# Patient Record
Sex: FEMALE | Race: OTHER | NOT HISPANIC OR LATINO
[De-identification: names, ages, dates, MRNs, and addresses within clinical notes are randomized per-mention and may not be internally consistent; named-entity substitution may affect disease eponyms.]

---

## 2017-04-12 ENCOUNTER — APPOINTMENT (OUTPATIENT)
Dept: DERMATOLOGY | Facility: CLINIC | Age: 22
End: 2017-04-12

## 2017-04-28 ENCOUNTER — APPOINTMENT (OUTPATIENT)
Dept: DERMATOLOGY | Facility: CLINIC | Age: 22
End: 2017-04-28

## 2017-06-03 ENCOUNTER — INPATIENT (INPATIENT)
Facility: HOSPITAL | Age: 22
LOS: 0 days | Discharge: LEFT BEFORE DISCHARGE | DRG: 641 | End: 2017-06-04
Attending: INTERNAL MEDICINE | Admitting: HOSPITALIST
Payer: COMMERCIAL

## 2017-06-03 VITALS
RESPIRATION RATE: 18 BRPM | HEART RATE: 122 BPM | TEMPERATURE: 105 F | OXYGEN SATURATION: 97 % | DIASTOLIC BLOOD PRESSURE: 68 MMHG | SYSTOLIC BLOOD PRESSURE: 99 MMHG

## 2017-06-03 DIAGNOSIS — A41.9 SEPSIS, UNSPECIFIED ORGANISM: ICD-10-CM

## 2017-06-03 LAB
ALBUMIN SERPL ELPH-MCNC: 4.1 G/DL — SIGNIFICANT CHANGE UP (ref 3.3–5)
ALP SERPL-CCNC: 51 U/L — SIGNIFICANT CHANGE UP (ref 40–120)
ALT FLD-CCNC: 24 U/L RC — SIGNIFICANT CHANGE UP (ref 10–45)
ANION GAP SERPL CALC-SCNC: 15 MMOL/L — SIGNIFICANT CHANGE UP (ref 5–17)
APPEARANCE UR: CLEAR — SIGNIFICANT CHANGE UP
AST SERPL-CCNC: 31 U/L — SIGNIFICANT CHANGE UP (ref 10–40)
BACTERIA # UR AUTO: ABNORMAL /HPF
BASE EXCESS BLDV CALC-SCNC: -0.7 MMOL/L — SIGNIFICANT CHANGE UP (ref -2–2)
BASOPHILS # BLD AUTO: 0 K/UL — SIGNIFICANT CHANGE UP (ref 0–0.2)
BASOPHILS NFR BLD AUTO: 0 % — SIGNIFICANT CHANGE UP (ref 0–2)
BILIRUB SERPL-MCNC: 0.3 MG/DL — SIGNIFICANT CHANGE UP (ref 0.2–1.2)
BILIRUB UR-MCNC: NEGATIVE — SIGNIFICANT CHANGE UP
BUN SERPL-MCNC: 10 MG/DL — SIGNIFICANT CHANGE UP (ref 7–23)
CA-I SERPL-SCNC: 1.27 MMOL/L — SIGNIFICANT CHANGE UP (ref 1.12–1.3)
CALCIUM SERPL-MCNC: 9.5 MG/DL — SIGNIFICANT CHANGE UP (ref 8.4–10.5)
CHLORIDE BLDV-SCNC: 101 MMOL/L — SIGNIFICANT CHANGE UP (ref 96–108)
CHLORIDE SERPL-SCNC: 99 MMOL/L — SIGNIFICANT CHANGE UP (ref 96–108)
CO2 BLDV-SCNC: 25 MMOL/L — SIGNIFICANT CHANGE UP (ref 22–30)
CO2 SERPL-SCNC: 22 MMOL/L — SIGNIFICANT CHANGE UP (ref 22–31)
COLOR SPEC: YELLOW — SIGNIFICANT CHANGE UP
CREAT SERPL-MCNC: 0.74 MG/DL — SIGNIFICANT CHANGE UP (ref 0.5–1.3)
DIFF PNL FLD: ABNORMAL
EOSINOPHIL # BLD AUTO: 0 K/UL — SIGNIFICANT CHANGE UP (ref 0–0.5)
EOSINOPHIL NFR BLD AUTO: 0.2 % — SIGNIFICANT CHANGE UP (ref 0–6)
EPI CELLS # UR: SIGNIFICANT CHANGE UP /HPF
GAS PNL BLDV: 134 MMOL/L — LOW (ref 136–145)
GAS PNL BLDV: SIGNIFICANT CHANGE UP
GLUCOSE BLDV-MCNC: 99 MG/DL — SIGNIFICANT CHANGE UP (ref 70–99)
GLUCOSE SERPL-MCNC: 102 MG/DL — HIGH (ref 70–99)
GLUCOSE UR QL: NEGATIVE — SIGNIFICANT CHANGE UP
HCG UR QL: NEGATIVE — SIGNIFICANT CHANGE UP
HCO3 BLDV-SCNC: 24 MMOL/L — SIGNIFICANT CHANGE UP (ref 21–29)
HCT VFR BLD CALC: 36 % — SIGNIFICANT CHANGE UP (ref 34.5–45)
HCT VFR BLDA CALC: 36 % — LOW (ref 39–50)
HGB BLD CALC-MCNC: 11.8 G/DL — SIGNIFICANT CHANGE UP (ref 11.5–15.5)
HGB BLD-MCNC: 12.2 G/DL — SIGNIFICANT CHANGE UP (ref 11.5–15.5)
KETONES UR-MCNC: ABNORMAL
LACTATE BLDV-MCNC: 1.3 MMOL/L — SIGNIFICANT CHANGE UP (ref 0.7–2)
LEUKOCYTE ESTERASE UR-ACNC: ABNORMAL
LYMPHOCYTES # BLD AUTO: 1 K/UL — SIGNIFICANT CHANGE UP (ref 1–3.3)
LYMPHOCYTES # BLD AUTO: 11.2 % — LOW (ref 13–44)
MCHC RBC-ENTMCNC: 30.1 PG — SIGNIFICANT CHANGE UP (ref 27–34)
MCHC RBC-ENTMCNC: 34 GM/DL — SIGNIFICANT CHANGE UP (ref 32–36)
MCV RBC AUTO: 88.5 FL — SIGNIFICANT CHANGE UP (ref 80–100)
MONOCYTES # BLD AUTO: 0.7 K/UL — SIGNIFICANT CHANGE UP (ref 0–0.9)
MONOCYTES NFR BLD AUTO: 8.1 % — SIGNIFICANT CHANGE UP (ref 2–14)
NEUTROPHILS # BLD AUTO: 7.2 K/UL — SIGNIFICANT CHANGE UP (ref 1.8–7.4)
NEUTROPHILS NFR BLD AUTO: 80.4 % — HIGH (ref 43–77)
NITRITE UR-MCNC: NEGATIVE — SIGNIFICANT CHANGE UP
OTHER CELLS CSF MANUAL: 6 ML/DL — LOW (ref 18–22)
PCO2 BLDV: 43 MMHG — SIGNIFICANT CHANGE UP (ref 35–50)
PH BLDV: 7.37 — SIGNIFICANT CHANGE UP (ref 7.35–7.45)
PH UR: 6 — SIGNIFICANT CHANGE UP (ref 5–8)
PLATELET # BLD AUTO: 128 K/UL — LOW (ref 150–400)
PO2 BLDV: 24 MMHG — LOW (ref 25–45)
POTASSIUM BLDV-SCNC: 3.3 MMOL/L — LOW (ref 3.5–5)
POTASSIUM SERPL-MCNC: 3.4 MMOL/L — LOW (ref 3.5–5.3)
POTASSIUM SERPL-SCNC: 3.4 MMOL/L — LOW (ref 3.5–5.3)
PROT SERPL-MCNC: 7.6 G/DL — SIGNIFICANT CHANGE UP (ref 6–8.3)
PROT UR-MCNC: SIGNIFICANT CHANGE UP
RAPID RVP RESULT: SIGNIFICANT CHANGE UP
RBC # BLD: 4.07 M/UL — SIGNIFICANT CHANGE UP (ref 3.8–5.2)
RBC # FLD: 10.7 % — SIGNIFICANT CHANGE UP (ref 10.3–14.5)
RBC CASTS # UR COMP ASSIST: ABNORMAL /HPF (ref 0–2)
SAO2 % BLDV: 37 % — LOW (ref 67–88)
SODIUM SERPL-SCNC: 136 MMOL/L — SIGNIFICANT CHANGE UP (ref 135–145)
SP GR SPEC: 1.01 — SIGNIFICANT CHANGE UP (ref 1.01–1.02)
UROBILINOGEN FLD QL: NEGATIVE — SIGNIFICANT CHANGE UP
WBC # BLD: 8.9 K/UL — SIGNIFICANT CHANGE UP (ref 3.8–10.5)
WBC # FLD AUTO: 8.9 K/UL — SIGNIFICANT CHANGE UP (ref 3.8–10.5)
WBC UR QL: SIGNIFICANT CHANGE UP /HPF (ref 0–5)

## 2017-06-03 PROCEDURE — 93010 ELECTROCARDIOGRAM REPORT: CPT

## 2017-06-03 PROCEDURE — 99285 EMERGENCY DEPT VISIT HI MDM: CPT | Mod: 25

## 2017-06-03 PROCEDURE — 99223 1ST HOSP IP/OBS HIGH 75: CPT

## 2017-06-03 PROCEDURE — 71020: CPT | Mod: 26

## 2017-06-03 RX ORDER — POTASSIUM CHLORIDE 20 MEQ
40 PACKET (EA) ORAL ONCE
Qty: 0 | Refills: 0 | Status: COMPLETED | OUTPATIENT
Start: 2017-06-03 | End: 2017-06-03

## 2017-06-03 RX ORDER — SODIUM CHLORIDE 9 MG/ML
1000 INJECTION INTRAMUSCULAR; INTRAVENOUS; SUBCUTANEOUS
Qty: 0 | Refills: 0 | Status: DISCONTINUED | OUTPATIENT
Start: 2017-06-03 | End: 2017-06-04

## 2017-06-03 RX ORDER — KETOROLAC TROMETHAMINE 30 MG/ML
15 SYRINGE (ML) INJECTION ONCE
Qty: 0 | Refills: 0 | Status: DISCONTINUED | OUTPATIENT
Start: 2017-06-03 | End: 2017-06-04

## 2017-06-03 RX ORDER — ACETAMINOPHEN 500 MG
1000 TABLET ORAL ONCE
Qty: 0 | Refills: 0 | Status: COMPLETED | OUTPATIENT
Start: 2017-06-03 | End: 2017-06-03

## 2017-06-03 RX ORDER — SODIUM CHLORIDE 9 MG/ML
500 INJECTION INTRAMUSCULAR; INTRAVENOUS; SUBCUTANEOUS
Qty: 0 | Refills: 0 | Status: COMPLETED | OUTPATIENT
Start: 2017-06-03 | End: 2017-06-03

## 2017-06-03 RX ORDER — SODIUM CHLORIDE 9 MG/ML
3 INJECTION INTRAMUSCULAR; INTRAVENOUS; SUBCUTANEOUS ONCE
Qty: 0 | Refills: 0 | Status: COMPLETED | OUTPATIENT
Start: 2017-06-03 | End: 2017-06-03

## 2017-06-03 RX ORDER — FAMOTIDINE 10 MG/ML
20 INJECTION INTRAVENOUS ONCE
Qty: 0 | Refills: 0 | Status: COMPLETED | OUTPATIENT
Start: 2017-06-03 | End: 2017-06-04

## 2017-06-03 RX ORDER — ONDANSETRON 8 MG/1
8 TABLET, FILM COATED ORAL ONCE
Qty: 0 | Refills: 0 | Status: COMPLETED | OUTPATIENT
Start: 2017-06-03 | End: 2017-06-03

## 2017-06-03 RX ADMIN — SODIUM CHLORIDE 100 MILLILITER(S): 9 INJECTION INTRAMUSCULAR; INTRAVENOUS; SUBCUTANEOUS at 21:51

## 2017-06-03 RX ADMIN — ONDANSETRON 8 MILLIGRAM(S): 8 TABLET, FILM COATED ORAL at 21:22

## 2017-06-03 RX ADMIN — Medication 400 MILLIGRAM(S): at 21:22

## 2017-06-03 RX ADMIN — SODIUM CHLORIDE 3 MILLILITER(S): 9 INJECTION INTRAMUSCULAR; INTRAVENOUS; SUBCUTANEOUS at 21:22

## 2017-06-03 RX ADMIN — SODIUM CHLORIDE 2000 MILLILITER(S): 9 INJECTION INTRAMUSCULAR; INTRAVENOUS; SUBCUTANEOUS at 21:22

## 2017-06-03 RX ADMIN — SODIUM CHLORIDE 2000 MILLILITER(S): 9 INJECTION INTRAMUSCULAR; INTRAVENOUS; SUBCUTANEOUS at 22:41

## 2017-06-03 RX ADMIN — SODIUM CHLORIDE 2000 MILLILITER(S): 9 INJECTION INTRAMUSCULAR; INTRAVENOUS; SUBCUTANEOUS at 22:42

## 2017-06-03 RX ADMIN — SODIUM CHLORIDE 2000 MILLILITER(S): 9 INJECTION INTRAMUSCULAR; INTRAVENOUS; SUBCUTANEOUS at 22:43

## 2017-06-03 RX ADMIN — SODIUM CHLORIDE 2000 MILLILITER(S): 9 INJECTION INTRAMUSCULAR; INTRAVENOUS; SUBCUTANEOUS at 21:23

## 2017-06-03 RX ADMIN — Medication 40 MILLIEQUIVALENT(S): at 21:51

## 2017-06-03 NOTE — ED ADULT NURSE NOTE - OBJECTIVE STATEMENT
pt here for fever  her heart rate is also fast  placed on code seosis and followed as per protocol  lungs are clear and she is alert with neuro wdl

## 2017-06-03 NOTE — ED PROVIDER NOTE - ATTENDING CONTRIBUTION TO CARE
------------ATTENDING NOTE------------   22 yo F w/ family c/o fever since this AM, associated diffuse myalgias, runny nose, unproductive cough, nausea, small amts nbnb vomiting, loose nb stools, no recent travel, LMP currently, likely viral but meeting sepsis criteria on arrival, overall clear chest, soft benign abd, awaiting labs/imaging and reassessments -->  - Taran Benson MD   ----------------------------------------------------------------------- ------------ATTENDING NOTE------------   20 yo F w/ family c/o fever since this AM, associated diffuse myalgias, runny nose, unproductive cough, nausea, small amts nbnb vomiting, loose nb stools, no recent travel, LMP currently, likely viral but meeting sepsis criteria on arrival, overall clear chest, soft benign abd, awaiting labs/imaging and reassessments --> improving VS w/ IVF and antipyretics, tolerating small amt PO, continue reassessments -->  - Taran Benson MD   ----------------------------------------------------------------------- ------------ATTENDING NOTE------------   20 yo F w/ family c/o fever since this AM, associated diffuse myalgias, runny nose, unproductive cough, nausea, small amts nbnb vomiting, loose nb stools, no recent travel, LMP currently, likely viral but meeting sepsis criteria on arrival, overall clear chest, soft benign abd, awaiting labs/imaging and reassessments --> improving VS w/ IVF and antipyretics, tolerating small amt PO, continue reassessments --> admit to medicine, pending C diff, no empiric abx, family updated.  - Taran Benson MD   -----------------------------------------------------------------------

## 2017-06-03 NOTE — ED PROVIDER NOTE - CARE PLAN
Principal Discharge DX:	Sepsis  Secondary Diagnosis:	Viral illness Principal Discharge DX:	Sepsis  Goal:	Hypokalemia, initial encounters  Secondary Diagnosis:	Viral illness  Secondary Diagnosis:	Dehydration, moderate

## 2017-06-03 NOTE — ED PROVIDER NOTE - OBJECTIVE STATEMENT
21 year old female otherwise healthy presenting with nausea, vomiting, and diarrhea ("every 15 minutes") since last night, accompanied by fever despite ibuprofen (last dose 1 hour prior to arrival), abdominal cramping, myalgias, and runny nose. Denies new foods, sick contacts, or recent travel. Denies chest pain or shortness of breath. Parents at bedside.

## 2017-06-04 VITALS
SYSTOLIC BLOOD PRESSURE: 97 MMHG | TEMPERATURE: 98 F | RESPIRATION RATE: 19 BRPM | DIASTOLIC BLOOD PRESSURE: 65 MMHG | OXYGEN SATURATION: 100 % | HEART RATE: 77 BPM

## 2017-06-04 DIAGNOSIS — R63.8 OTHER SYMPTOMS AND SIGNS CONCERNING FOOD AND FLUID INTAKE: ICD-10-CM

## 2017-06-04 DIAGNOSIS — A08.4 VIRAL INTESTINAL INFECTION, UNSPECIFIED: ICD-10-CM

## 2017-06-04 DIAGNOSIS — Z29.9 ENCOUNTER FOR PROPHYLACTIC MEASURES, UNSPECIFIED: ICD-10-CM

## 2017-06-04 LAB
ANION GAP SERPL CALC-SCNC: 13 MMOL/L — SIGNIFICANT CHANGE UP (ref 5–17)
BASOPHILS # BLD AUTO: 0 K/UL — SIGNIFICANT CHANGE UP (ref 0–0.2)
BASOPHILS NFR BLD AUTO: 0 % — SIGNIFICANT CHANGE UP (ref 0–2)
BUN SERPL-MCNC: 6 MG/DL — LOW (ref 7–23)
C DIFF GDH STL QL: NEGATIVE — SIGNIFICANT CHANGE UP
C DIFF GDH STL QL: SIGNIFICANT CHANGE UP
CALCIUM SERPL-MCNC: 9 MG/DL — SIGNIFICANT CHANGE UP (ref 8.4–10.5)
CHLORIDE SERPL-SCNC: 109 MMOL/L — HIGH (ref 96–108)
CO2 SERPL-SCNC: 19 MMOL/L — LOW (ref 22–31)
CREAT SERPL-MCNC: 0.59 MG/DL — SIGNIFICANT CHANGE UP (ref 0.5–1.3)
CULTURE RESULTS: SIGNIFICANT CHANGE UP
EOSINOPHIL # BLD AUTO: 0 K/UL — SIGNIFICANT CHANGE UP (ref 0–0.5)
EOSINOPHIL NFR BLD AUTO: 0 % — SIGNIFICANT CHANGE UP (ref 0–6)
GLUCOSE SERPL-MCNC: 81 MG/DL — SIGNIFICANT CHANGE UP (ref 70–99)
HCT VFR BLD CALC: 31.9 % — LOW (ref 34.5–45)
HGB BLD-MCNC: 10.6 G/DL — LOW (ref 11.5–15.5)
LYMPHOCYTES # BLD AUTO: 0.81 K/UL — LOW (ref 1–3.3)
LYMPHOCYTES # BLD AUTO: 18 % — SIGNIFICANT CHANGE UP (ref 13–44)
MCHC RBC-ENTMCNC: 28.6 PG — SIGNIFICANT CHANGE UP (ref 27–34)
MCHC RBC-ENTMCNC: 33.2 GM/DL — SIGNIFICANT CHANGE UP (ref 32–36)
MCV RBC AUTO: 86.2 FL — SIGNIFICANT CHANGE UP (ref 80–100)
MONOCYTES # BLD AUTO: 0.59 K/UL — SIGNIFICANT CHANGE UP (ref 0–0.9)
MONOCYTES NFR BLD AUTO: 13 % — SIGNIFICANT CHANGE UP (ref 2–14)
NEUTROPHILS # BLD AUTO: 3.11 K/UL — SIGNIFICANT CHANGE UP (ref 1.8–7.4)
NEUTROPHILS NFR BLD AUTO: 52 % — SIGNIFICANT CHANGE UP (ref 43–77)
PLATELET # BLD AUTO: SIGNIFICANT CHANGE UP (ref 150–400)
POTASSIUM SERPL-MCNC: 4.6 MMOL/L — SIGNIFICANT CHANGE UP (ref 3.5–5.3)
POTASSIUM SERPL-SCNC: 4.6 MMOL/L — SIGNIFICANT CHANGE UP (ref 3.5–5.3)
RBC # BLD: 3.7 M/UL — LOW (ref 3.8–5.2)
RBC # FLD: 12.9 % — SIGNIFICANT CHANGE UP (ref 10.3–14.5)
SODIUM SERPL-SCNC: 141 MMOL/L — SIGNIFICANT CHANGE UP (ref 135–145)
SPECIMEN SOURCE: SIGNIFICANT CHANGE UP
WBC # BLD: 4.51 K/UL — SIGNIFICANT CHANGE UP (ref 3.8–10.5)
WBC # FLD AUTO: 4.51 K/UL — SIGNIFICANT CHANGE UP (ref 3.8–10.5)

## 2017-06-04 RX ORDER — POTASSIUM CHLORIDE 20 MEQ
20 PACKET (EA) ORAL ONCE
Qty: 0 | Refills: 0 | Status: COMPLETED | OUTPATIENT
Start: 2017-06-04 | End: 2017-06-04

## 2017-06-04 RX ORDER — LOPERAMIDE HCL 2 MG
4 TABLET ORAL ONCE
Qty: 0 | Refills: 0 | Status: COMPLETED | OUTPATIENT
Start: 2017-06-04 | End: 2017-06-04

## 2017-06-04 RX ORDER — ACETAMINOPHEN 500 MG
325 TABLET ORAL EVERY 6 HOURS
Qty: 0 | Refills: 0 | Status: DISCONTINUED | OUTPATIENT
Start: 2017-06-04 | End: 2017-06-04

## 2017-06-04 RX ORDER — ACETAMINOPHEN 500 MG
650 TABLET ORAL EVERY 6 HOURS
Qty: 0 | Refills: 0 | Status: DISCONTINUED | OUTPATIENT
Start: 2017-06-04 | End: 2017-06-04

## 2017-06-04 RX ORDER — SODIUM CHLORIDE 9 MG/ML
1000 INJECTION, SOLUTION INTRAVENOUS
Qty: 0 | Refills: 0 | Status: DISCONTINUED | OUTPATIENT
Start: 2017-06-04 | End: 2017-06-04

## 2017-06-04 RX ORDER — LOPERAMIDE HCL 2 MG
2 TABLET ORAL
Qty: 0 | Refills: 0 | Status: DISCONTINUED | OUTPATIENT
Start: 2017-06-04 | End: 2017-06-04

## 2017-06-04 RX ORDER — ONDANSETRON 8 MG/1
4 TABLET, FILM COATED ORAL EVERY 6 HOURS
Qty: 0 | Refills: 0 | Status: DISCONTINUED | OUTPATIENT
Start: 2017-06-04 | End: 2017-06-04

## 2017-06-04 RX ADMIN — Medication 15 MILLIGRAM(S): at 02:03

## 2017-06-04 RX ADMIN — Medication 2 MILLIGRAM(S): at 02:43

## 2017-06-04 RX ADMIN — SODIUM CHLORIDE 150 MILLILITER(S): 9 INJECTION, SOLUTION INTRAVENOUS at 00:45

## 2017-06-04 RX ADMIN — SODIUM CHLORIDE 150 MILLILITER(S): 9 INJECTION, SOLUTION INTRAVENOUS at 11:05

## 2017-06-04 RX ADMIN — FAMOTIDINE 20 MILLIGRAM(S): 10 INJECTION INTRAVENOUS at 01:58

## 2017-06-04 RX ADMIN — Medication 4 MILLIGRAM(S): at 00:37

## 2017-06-04 RX ADMIN — Medication 20 MILLIEQUIVALENT(S): at 06:49

## 2017-06-04 RX ADMIN — Medication 15 MILLIGRAM(S): at 01:59

## 2017-06-04 NOTE — H&P ADULT. - ASSESSMENT
This is a 21 year old woman with no past medical history presenting with diarrhea, nausea, vomiting, fevers. This is a 21 year old woman with no past medical history presenting with diarrhea, nausea, vomiting, fevers. Suspect viral gastroenteritis, very low suspicion for C. Diff given no recent antibiotics, immunocompetent, despite recent exposure. Will treat supportively with imodium, tylenol, zofran, fluids. Case discussed with Dr. Benson. This is a 21 year old woman with no past medical history presenting with diarrhea, nausea, vomiting, fevers. Suspect viral gastroenteritis, very low suspicion for C. Diff given no recent antibiotics, immunocompetent, despite recent exposure. Will treat supportively with imodium, tylenol, zofran, fluids.  Exam benign, low suspicion for other acute intra-abdominal pathology (torsion vs. appy).  Will consider further imaging should clinical scenario require. Case discussed with Dr. Benson.

## 2017-06-04 NOTE — DISCHARGE NOTE ADULT - PATIENT PORTAL LINK FT
“You can access the [facility] Patient Portal, offered by [facility], by registering with the following website: [email or web address]”

## 2017-06-04 NOTE — H&P ADULT. - HISTORY OF PRESENT ILLNESS
This is a 21 year old woman with no past medical history presenting with diarrhea, nausea, vomiting, fevers. Patient notes that evening prior to admission, she began to have generalized aches and pains, diffuse abdominal pain, subjective fevers (Tmax 105 at home), nausea. She notes that day of admission she woke up with severe watery brown diarrhea, has had 10 episodes throughout the day. She also states that her nausea worsened, had a single episode of NBNB emesis after eating some white rice. She notes that she was having normal BMs prior to day of admission, taking normal POs. She endorses light headedness, denies HA, rhinorrhea (chronic allergic rhinitis), sore throat, cough, dysuria, rash. She notes an aunt with C. Diff, but she has not taken antibiotics in the last year. She notes that her period started the same day of the onset of her symptoms. She notes that her periods come irregularly, from less than a month apart to over two months apart. She notes that typically when they come less that a month apart she has severe cramping and has also had fevers in the past, she initially attributed her symptoms to this. She tried OCPs 1 year ago, but discontinued because of mood symptoms. She is sexually active with a single partner, uses condoms religiously, tested for STDs 2 months ago, negative.     In ED, HR initially 122, improved to 100 with 2L NS (per ED, though only 500cc ordered), BP 99/68, Tmax 105.  Labs notable for WBC 8.9, Hgb 12.2, Plt 128, Na 136, Cr 0.74, lactate 1.3, upreg negative, UA with trace ketones, RVP negative.  CXR clear. This is a 21 year old woman with no past medical history presenting with diarrhea, nausea, vomiting, fevers. Patient notes that evening prior to admission, she began to have generalized aches and pains, diffuse abdominal pain, subjective fevers (Tmax 105 at home), nausea. She notes that day of admission she woke up with severe watery brown diarrhea, has had 10 episodes throughout the day. She also states that her nausea worsened, had a single episode of NBNB emesis after eating some white rice. She notes that she was having normal BMs prior to day of admission, taking normal POs. She endorses light headedness, denies HA, rhinorrhea (chronic allergic rhinitis), sore throat, cough, dysuria, rash. She notes that her grandmother passed away from C. Diff 3/2017, she spent significant amounts of time with her before she passed away.  She notes that she has recurrent UTIs, most recently took amoxicillin 2 months ago for this.  She notes that her period started the same day of the onset of her symptoms. She notes that her periods come irregularly, from less than a month apart to over two months apart. She notes that typically when they come less that a month apart she has severe cramping and has also had fevers in the past, she initially attributed her symptoms to this. She tried OCPs 1 year ago, but discontinued because of mood symptoms. She is sexually active with a single partner, uses condoms religiously, tested for STDs 2 months ago, negative.     In ED, HR initially 122, improved to 100 with 2L NS (per ED, though only 500cc ordered), BP 99/68, Tmax 105.  Labs notable for WBC 8.9, Hgb 12.2, Plt 128, Na 136, Cr 0.74, lactate 1.3, upreg negative, UA with trace ketones, RVP negative.  CXR clear.

## 2017-06-04 NOTE — DISCHARGE NOTE ADULT - CARE PLAN
Principal Discharge DX:	Dehydration, moderate  Goal:	keep hydrated drink  fluid  Instructions for follow-up, activity and diet:	clear diet  Secondary Diagnosis:	Gastroenteritis

## 2017-06-04 NOTE — H&P ADULT. - LAB RESULTS AND INTERPRETATION
Labs personally reviewed. Labs notable for WBC 8.9, Hgb 12.2, Plt 128, Na 136, Cr 0.74, lactate 1.3, upreg negative, UA with trace ketones, RVP negative.

## 2017-06-04 NOTE — H&P ADULT. - PROBLEM SELECTOR PLAN 1
--imodium 4mg now, 2mg PRN for BMs  --tylenol PRN for subjective fevers  --tylenol 325 for mild pain, 650 for moderate, percocet 5/325 for severe  --zofran IV PRN for nausea  --LR @ 150 overnight  --clear liquid diet, advance as tolerated

## 2017-06-09 LAB
CULTURE RESULTS: SIGNIFICANT CHANGE UP
CULTURE RESULTS: SIGNIFICANT CHANGE UP
SPECIMEN SOURCE: SIGNIFICANT CHANGE UP
SPECIMEN SOURCE: SIGNIFICANT CHANGE UP

## 2017-11-25 ENCOUNTER — EMERGENCY (EMERGENCY)
Facility: HOSPITAL | Age: 22
LOS: 1 days | Discharge: HOME | End: 2017-11-25
Attending: EMERGENCY MEDICINE | Admitting: EMERGENCY MEDICINE
Payer: MEDICAID

## 2017-11-25 VITALS
OXYGEN SATURATION: 99 % | HEART RATE: 87 BPM | RESPIRATION RATE: 16 BRPM | SYSTOLIC BLOOD PRESSURE: 106 MMHG | DIASTOLIC BLOOD PRESSURE: 68 MMHG | TEMPERATURE: 98 F

## 2017-11-25 PROCEDURE — 73140 X-RAY EXAM OF FINGER(S): CPT

## 2017-11-25 PROCEDURE — 99283 EMERGENCY DEPT VISIT LOW MDM: CPT

## 2017-11-25 PROCEDURE — 73140 X-RAY EXAM OF FINGER(S): CPT | Mod: 26,RT

## 2017-11-25 PROCEDURE — 99283 EMERGENCY DEPT VISIT LOW MDM: CPT | Mod: 25

## 2017-11-25 RX ORDER — IBUPROFEN 200 MG
600 TABLET ORAL ONCE
Qty: 0 | Refills: 0 | Status: COMPLETED | OUTPATIENT
Start: 2017-11-25 | End: 2017-11-25

## 2017-11-25 RX ADMIN — Medication 600 MILLIGRAM(S): at 15:19

## 2017-11-25 NOTE — ED ADULT NURSE NOTE - OBJECTIVE STATEMENT
22 year old female a/ox3 ambulatory presenting to ed with finger injury after smashing finger in car door 3 days agom has been taking motrin with pain relief  but feels increased pressure to finger.

## 2017-11-25 NOTE — ED PROVIDER NOTE - MEDICAL DECISION MAKING DETAILS
Pt with accidental trauma to right rhumb had jammed in closing car door  no active bleeding Has left subungual hematoma less than 50% of nail surface  will do conservative management xrays neg for fracture -- Leggett

## 2017-11-25 NOTE — ED PROVIDER NOTE - OBJECTIVE STATEMENT
23yo female pt, ambulatory c/o right dominant thumb pain/ swelling s/p injury, jammed by a car door 2days ago. Denies other injuries. Pt stated the swelling went down a little, but wanted to evaluate. Denies sensory changes or weakness to the finger.

## 2017-11-25 NOTE — ED PROVIDER NOTE - ATTENDING CONTRIBUTION TO CARE
I have seen and evaluated this patient with the advance practice clinician.   I agree with the findings  unless other wise stated. I have amended notes where needed.  After my face to face bedside evaluation, I am noting: Pt with accidental trauma to right rhumb had jammed in closing car door  no active bleeding Has left subungual hematoma less than 50% of nail surface  will do conservative management xrays neg for fracture -- Leggett

## 2017-12-15 PROCEDURE — 87040 BLOOD CULTURE FOR BACTERIA: CPT

## 2017-12-15 PROCEDURE — 71046 X-RAY EXAM CHEST 2 VIEWS: CPT

## 2017-12-15 PROCEDURE — 82435 ASSAY OF BLOOD CHLORIDE: CPT

## 2017-12-15 PROCEDURE — 87798 DETECT AGENT NOS DNA AMP: CPT

## 2017-12-15 PROCEDURE — 96374 THER/PROPH/DIAG INJ IV PUSH: CPT

## 2017-12-15 PROCEDURE — 87324 CLOSTRIDIUM AG IA: CPT

## 2017-12-15 PROCEDURE — 99285 EMERGENCY DEPT VISIT HI MDM: CPT | Mod: 25

## 2017-12-15 PROCEDURE — 93005 ELECTROCARDIOGRAM TRACING: CPT

## 2017-12-15 PROCEDURE — 82803 BLOOD GASES ANY COMBINATION: CPT

## 2017-12-15 PROCEDURE — 83690 ASSAY OF LIPASE: CPT

## 2017-12-15 PROCEDURE — 81025 URINE PREGNANCY TEST: CPT

## 2017-12-15 PROCEDURE — 84295 ASSAY OF SERUM SODIUM: CPT

## 2017-12-15 PROCEDURE — G0378: CPT

## 2017-12-15 PROCEDURE — 87633 RESP VIRUS 12-25 TARGETS: CPT

## 2017-12-15 PROCEDURE — 87486 CHLMYD PNEUM DNA AMP PROBE: CPT

## 2017-12-15 PROCEDURE — 87581 M.PNEUMON DNA AMP PROBE: CPT

## 2017-12-15 PROCEDURE — 84132 ASSAY OF SERUM POTASSIUM: CPT

## 2017-12-15 PROCEDURE — 82330 ASSAY OF CALCIUM: CPT

## 2017-12-15 PROCEDURE — 85014 HEMATOCRIT: CPT

## 2017-12-15 PROCEDURE — 83605 ASSAY OF LACTIC ACID: CPT

## 2017-12-15 PROCEDURE — 85027 COMPLETE CBC AUTOMATED: CPT

## 2017-12-15 PROCEDURE — 82947 ASSAY GLUCOSE BLOOD QUANT: CPT

## 2017-12-15 PROCEDURE — 96375 TX/PRO/DX INJ NEW DRUG ADDON: CPT

## 2017-12-15 PROCEDURE — 83735 ASSAY OF MAGNESIUM: CPT

## 2017-12-15 PROCEDURE — 87086 URINE CULTURE/COLONY COUNT: CPT

## 2017-12-15 PROCEDURE — 80048 BASIC METABOLIC PNL TOTAL CA: CPT

## 2017-12-15 PROCEDURE — 80053 COMPREHEN METABOLIC PANEL: CPT

## 2017-12-15 PROCEDURE — 81001 URINALYSIS AUTO W/SCOPE: CPT

## 2017-12-15 PROCEDURE — 84100 ASSAY OF PHOSPHORUS: CPT

## 2018-06-08 ENCOUNTER — EMERGENCY (EMERGENCY)
Facility: HOSPITAL | Age: 23
LOS: 1 days | Discharge: HOME | End: 2018-06-08
Attending: EMERGENCY MEDICINE | Admitting: EMERGENCY MEDICINE
Payer: MEDICAID

## 2018-06-08 VITALS
RESPIRATION RATE: 20 BRPM | DIASTOLIC BLOOD PRESSURE: 64 MMHG | SYSTOLIC BLOOD PRESSURE: 119 MMHG | TEMPERATURE: 98 F | OXYGEN SATURATION: 100 % | HEART RATE: 82 BPM

## 2018-06-08 DIAGNOSIS — Z34.90 ENCOUNTER FOR SUPERVISION OF NORMAL PREGNANCY, UNSPECIFIED, UNSPECIFIED TRIMESTER: ICD-10-CM

## 2018-06-08 LAB
ALBUMIN SERPL ELPH-MCNC: 4.4 G/DL — SIGNIFICANT CHANGE UP (ref 3.3–5)
ALP SERPL-CCNC: 50 U/L — SIGNIFICANT CHANGE UP (ref 40–120)
ALT FLD-CCNC: 11 U/L — SIGNIFICANT CHANGE UP (ref 4–33)
APPEARANCE UR: SIGNIFICANT CHANGE UP
AST SERPL-CCNC: 18 U/L — SIGNIFICANT CHANGE UP (ref 4–32)
BACTERIA # UR AUTO: SIGNIFICANT CHANGE UP
BASOPHILS # BLD AUTO: 0.04 K/UL — SIGNIFICANT CHANGE UP (ref 0–0.2)
BASOPHILS NFR BLD AUTO: 0.6 % — SIGNIFICANT CHANGE UP (ref 0–2)
BILIRUB SERPL-MCNC: 0.2 MG/DL — SIGNIFICANT CHANGE UP (ref 0.2–1.2)
BILIRUB UR-MCNC: NEGATIVE — SIGNIFICANT CHANGE UP
BLD GP AB SCN SERPL QL: NEGATIVE — SIGNIFICANT CHANGE UP
BLOOD UR QL VISUAL: NEGATIVE — SIGNIFICANT CHANGE UP
BUN SERPL-MCNC: 11 MG/DL — SIGNIFICANT CHANGE UP (ref 7–23)
CALCIUM SERPL-MCNC: 9.4 MG/DL — SIGNIFICANT CHANGE UP (ref 8.4–10.5)
CHLORIDE SERPL-SCNC: 101 MMOL/L — SIGNIFICANT CHANGE UP (ref 98–107)
CO2 SERPL-SCNC: 24 MMOL/L — SIGNIFICANT CHANGE UP (ref 22–31)
COLOR SPEC: SIGNIFICANT CHANGE UP
CREAT SERPL-MCNC: 0.52 MG/DL — SIGNIFICANT CHANGE UP (ref 0.5–1.3)
EOSINOPHIL # BLD AUTO: 0.12 K/UL — SIGNIFICANT CHANGE UP (ref 0–0.5)
EOSINOPHIL NFR BLD AUTO: 1.9 % — SIGNIFICANT CHANGE UP (ref 0–6)
GLUCOSE SERPL-MCNC: 91 MG/DL — SIGNIFICANT CHANGE UP (ref 70–99)
GLUCOSE UR-MCNC: NEGATIVE — SIGNIFICANT CHANGE UP
HCG SERPL-ACNC: 549.9 MIU/ML — SIGNIFICANT CHANGE UP
HCT VFR BLD CALC: 32.3 % — LOW (ref 34.5–45)
HGB BLD-MCNC: 11 G/DL — LOW (ref 11.5–15.5)
IMM GRANULOCYTES # BLD AUTO: 0.02 # — SIGNIFICANT CHANGE UP
IMM GRANULOCYTES NFR BLD AUTO: 0.3 % — SIGNIFICANT CHANGE UP (ref 0–1.5)
KETONES UR-MCNC: NEGATIVE — SIGNIFICANT CHANGE UP
LEUKOCYTE ESTERASE UR-ACNC: HIGH
LYMPHOCYTES # BLD AUTO: 2.15 K/UL — SIGNIFICANT CHANGE UP (ref 1–3.3)
LYMPHOCYTES # BLD AUTO: 34.1 % — SIGNIFICANT CHANGE UP (ref 13–44)
MCHC RBC-ENTMCNC: 28.4 PG — SIGNIFICANT CHANGE UP (ref 27–34)
MCHC RBC-ENTMCNC: 34.1 % — SIGNIFICANT CHANGE UP (ref 32–36)
MCV RBC AUTO: 83.2 FL — SIGNIFICANT CHANGE UP (ref 80–100)
MONOCYTES # BLD AUTO: 0.58 K/UL — SIGNIFICANT CHANGE UP (ref 0–0.9)
MONOCYTES NFR BLD AUTO: 9.2 % — SIGNIFICANT CHANGE UP (ref 2–14)
MUCOUS THREADS # UR AUTO: SIGNIFICANT CHANGE UP
NEUTROPHILS # BLD AUTO: 3.39 K/UL — SIGNIFICANT CHANGE UP (ref 1.8–7.4)
NEUTROPHILS NFR BLD AUTO: 53.9 % — SIGNIFICANT CHANGE UP (ref 43–77)
NITRITE UR-MCNC: NEGATIVE — SIGNIFICANT CHANGE UP
NON-SQ EPI CELLS # UR AUTO: <1 — SIGNIFICANT CHANGE UP
NRBC # FLD: 0 — SIGNIFICANT CHANGE UP
PH UR: 6.5 — SIGNIFICANT CHANGE UP (ref 4.6–8)
PLATELET # BLD AUTO: 167 K/UL — SIGNIFICANT CHANGE UP (ref 150–400)
PMV BLD: 11.8 FL — SIGNIFICANT CHANGE UP (ref 7–13)
POTASSIUM SERPL-MCNC: 3.8 MMOL/L — SIGNIFICANT CHANGE UP (ref 3.5–5.3)
POTASSIUM SERPL-SCNC: 3.8 MMOL/L — SIGNIFICANT CHANGE UP (ref 3.5–5.3)
PROT SERPL-MCNC: 7.4 G/DL — SIGNIFICANT CHANGE UP (ref 6–8.3)
PROT UR-MCNC: NEGATIVE MG/DL — SIGNIFICANT CHANGE UP
RBC # BLD: 3.88 M/UL — SIGNIFICANT CHANGE UP (ref 3.8–5.2)
RBC # FLD: 12.2 % — SIGNIFICANT CHANGE UP (ref 10.3–14.5)
RBC CASTS # UR COMP ASSIST: SIGNIFICANT CHANGE UP (ref 0–?)
RH IG SCN BLD-IMP: POSITIVE — SIGNIFICANT CHANGE UP
SODIUM SERPL-SCNC: 136 MMOL/L — SIGNIFICANT CHANGE UP (ref 135–145)
SP GR SPEC: 1.01 — SIGNIFICANT CHANGE UP (ref 1–1.04)
SQUAMOUS # UR AUTO: SIGNIFICANT CHANGE UP
UROBILINOGEN FLD QL: NORMAL MG/DL — SIGNIFICANT CHANGE UP
WBC # BLD: 6.3 K/UL — SIGNIFICANT CHANGE UP (ref 3.8–10.5)
WBC # FLD AUTO: 6.3 K/UL — SIGNIFICANT CHANGE UP (ref 3.8–10.5)
WBC UR QL: SIGNIFICANT CHANGE UP (ref 0–?)

## 2018-06-08 PROCEDURE — 99285 EMERGENCY DEPT VISIT HI MDM: CPT

## 2018-06-08 PROCEDURE — 76830 TRANSVAGINAL US NON-OB: CPT | Mod: 26

## 2018-06-08 RX ORDER — NITROFURANTOIN MACROCRYSTAL 50 MG
100 CAPSULE ORAL ONCE
Qty: 0 | Refills: 0 | Status: DISCONTINUED | OUTPATIENT
Start: 2018-06-08 | End: 2018-06-08

## 2018-06-08 NOTE — ED ADULT TRIAGE NOTE - CHIEF COMPLAINT QUOTE
pt sent by pmd r/o ectopic pt early pregnancy c/o vaginal spotting abdominal cramping evaluated  by pmd yesterday had vaginal sonogram

## 2018-06-08 NOTE — ED PROVIDER NOTE - PROGRESS NOTE DETAILS
LAI JAMES: US Tech called regarding US to r/o ectopic, states there are 4-5 patients ahead, and will tried to expedite. PA JACOB: pt states she was treated for UTI with macrobid x7 days and Amoxicillin x4 days prior to it and symptoms resolved. US: questionable early intrauterine gestation. Discussed with attending, GYN consult. JW Pt received Tx for UTI as out patient.  Gestational sac without confirmed IUP. PT evaluated by OBYGYN placed on the Beta List and will follow up as outpatient or return to the ED for repeat US and HCG.  I reviewed the alarm symptoms of this patient's diagnosis and discussed criteria for their return to the emergency department.  I instructed the patient to return to the emergency department with any alarm symptoms for their specific diagnosis including abdominal pain, vaginal bleeding, nausea, vomiting, any worsening symptoms, and any other concerns.  I instructed this patient to call their primary doctor today, to inform them of their visit to the emergency department, and to obtain a repeat evaluation in the next 24 hours.  This patient understood and agreed with our plan for follow up and felt safe returning home.  At the time of discharge this patient remained in stable condition, in no acute distress, with stable vital signs. JW Pt received Tx for UTI as out patient.  Gestational sac without confirmed IUP. Repeat Exam: Abdomen non-distended without ecchymosis.  Normal bowel sounds.   No tenderness upon percussion in all four quadrants.  No tenderness on palpation in all four quadrants.  No mass, rigidity, guarding, rebound, or organomegaly. PT evaluated by OBYGYN placed on the Beta List and will follow up as outpatient or return to the ED for repeat US and HCG.  I reviewed the alarm symptoms of this patient's diagnosis and discussed criteria for their return to the emergency department.  I instructed the patient to return to the emergency department with any alarm symptoms for their specific diagnosis including abdominal pain, vaginal bleeding, nausea, vomiting, any worsening symptoms, and any other concerns.  I instructed this patient to call their primary doctor today, to inform them of their visit to the emergency department, and to obtain a repeat evaluation in the next 24 hours.  This patient understood and agreed with our plan for follow up and felt safe returning home.  At the time of discharge this patient remained in stable condition, in no acute distress, with stable vital signs.

## 2018-06-08 NOTE — ED PROVIDER NOTE - ATTENDING CONTRIBUTION TO CARE
22F recently found out she was pregnant.  Vag spotting x 3 days, some lower abd pain yesterday went to OB, did US no FHR so concerned for ectopic so sent to ED.  Nontender abd, whitish d/c. No pelvic tenderness.  Check labs, US TV, beta HCG and type.  Low suspicion for ectopic.    VS:  unremarkable    GEN - NAD; well appearing; A+O x3   HEAD - NC/AT     ENT - PEERL, EOMI, mucous membranes  moist , no discharge      NECK: Neck supple, non-tender without lymphadenopathy, no masses, no JVD  PULM - CTA b/l,  symmetric breath sounds  COR -  normal heart sounds    ABD - , ND, NT, soft, no guarding, no rebound, no masses    BACK - no CVA tenderness, nontender spine     EXTREMS - no edema, no deformity, warm and well perfused    SKIN - no rash or bruising      NEUROLOGIC - alert, CN 2-12 intact, sensation nl, motor 5/5 RUE/LUE/RLE/LLE.

## 2018-06-08 NOTE — ED PROVIDER NOTE - MEDICAL DECISION MAKING DETAILS
22 year old female with no PMHx, , LMP 18, sent in by GYN for r/o ectopic pregnancy. Reports episodes of suprapubic tenderness and vaginal spotting.   Plan: CBC, CMP, BHCG, type and screen, transvaginal US 22 year old female with no PMHx, , LMP 18, sent in by GYN for r/o ectopic pregnancy, p/w vaginal spotting x3 days and lower abdominal pain since yesterday.   -well appearing female, , no PMH sent in by GYN to r/o ectopic. Pt had sonogram yesterday with GYN, no fetal HR, advised to go ER but wanted second opinion but unable to get appointment, presents to ER w/ vaginal spotting x3 days, lower abdominal pain since yesterday. Plan: TVUS, Hcg, labs, T&S, coags, UA, Ucx, and reassess.   Plan: CBC, CMP, BHCG, type and screen, transvaginal US

## 2018-06-08 NOTE — CONSULT NOTE ADULT - ASSESSMENT
23 y/o  LMP  with pregnancy of unknown location, possibly early IUP but too soon to tell based on hcg level and sono

## 2018-06-08 NOTE — ED PROVIDER NOTE - OBJECTIVE STATEMENT
22 year old female with no PMHx , LMP 18, sent in by GYN for r/o ectopic pregnancy. Pt reports episodes of suprapubic abd pain x 2 days. States the pain last for a few minutes and then goes away; episodes have occurred 1-2x each day. Also reports vaginal spotting to underwear x 2-3 days. Yesterday, pt presented to GYN yesterday after home urine pregnancy test on  was positive. At OBGYN, heartbeat of fetus was not found. Pt states no blood work was drawn and was told to come to the ED for workup on possibly ectopic pregnancy. Denies any diarrhea, vaginal discharge, hematuria, increased urinary frequency, hematochezia, fever, chills, HA,  dizziness, light-headedness, CP or SOB.

## 2018-06-08 NOTE — ED PROVIDER NOTE - CERVIX
creamy-white vaginal discharge in vaginal vault, unable to visualize cervix secondary to d/c creamy-white vaginal discharge in vaginal vault, unable to visualize cervix secondary to d/c/non tender

## 2018-06-08 NOTE — CONSULT NOTE ADULT - SUBJECTIVE AND OBJECTIVE BOX
21 y/o  LMP 5/5 presenting to ED on her physician's request for r/o ectopic. Pt reports that she was in Dr. Clements's office yesterday for what felt like slight menstrual cramps and some vaginal spotting. Bedside ultrasound was performed and no IUP was seen so pt was advised to go to the ER. Pt called a different doctor for a possible second opinion today and was advised that she cannot be seen in the office for that since it could be emergent and that she should come to the ED. Here she has minimal abd cramping, no vaginal bleeding at this moment but had some spotting earlier. Pt reports some nausea and one episode of vomiting earlier, none now. Denies CP, SOB, fevers, chills  OBHx: N/A  GynHx: Denies  PMH: Denies  SHx: Denies  Meds: None  All: NKDA    VS  T(C): 36.8 (18 @ 15:04)  HR: 82 (18 @ 15:04)  BP: 119/64 (18 @ 15:04)  RR: 20 (18 @ 15:04)  SpO2: 100% (18 @ 15:04)    Physical Exam:  General: NAD  Abdomen: Soft, non-tender, non-distended, no rebound, no guarding               11.0   6.30  )-----------( 167      (  @ 15:55 )             32.3      @ 15:55    136  |  101  |  11  ----------------------------<  91  3.8   |  24  |  0.52    Ca    9.4       @ 15:55    TPro  7.4  /  Alb  4.4  /  TBili  0.2  /  DBili  x   /  AST  18  /  ALT  11  /  AlkPhos  50   @ 15:55    HCG Quantitative, Serum (18 @ 15:55)    HCG Quantitative, Serum: 549.9    < from: US Transvaginal (18 @ 18:04) >  FINDINGS:    Uterus: 7.2 x 4.2 x 5.8 cm. Within normal limits.    Endometrium: 23 mm. Questionable 3 mm gestational sac Right ovary: 3.6 x   1.0 x 2.1 cm. Within normal limits.    Left ovary: 5.2 x 2.4 x 2.7 cm. 2.3 x 1.3 x 1.7 cm corpus luteal cyst.    Fluid: None.    IMPRESSION:    Questionable early intrauterine gestation. Recommend correlation with   serial quantitative beta-hCG levels and follow-up sonography.    < end of copied text >

## 2018-06-08 NOTE — ED ADULT NURSE NOTE - OBJECTIVE STATEMENT
Patient presented to ED with c/o lower abdominal crmping since last night with vaginal spotting.  Patient saw OB and was instructed to go to ED for r/o ectopic d/t no visual of embryo.  Blood work drawn and sent to lab.  ER physician evaluated patient, plan is for sono.  Family present t the bedside, will continue to monitor patient closely.  Maribeth CHAMORRO

## 2018-06-08 NOTE — CONSULT NOTE ADULT - PROBLEM SELECTOR RECOMMENDATION 9
- No acute gyn intervention  - f/u with Dr. Clements on Monday for repeat bHCG    d/w Dr. Starr Miles, PGY-2  [phone]

## 2018-06-10 LAB
BACTERIA UR CULT: SIGNIFICANT CHANGE UP
SPECIMEN SOURCE: SIGNIFICANT CHANGE UP

## 2018-06-12 ENCOUNTER — EMERGENCY (EMERGENCY)
Facility: HOSPITAL | Age: 23
LOS: 1 days | Discharge: HOME | End: 2018-06-12
Attending: EMERGENCY MEDICINE | Admitting: EMERGENCY MEDICINE
Payer: MEDICAID

## 2018-06-12 VITALS
OXYGEN SATURATION: 100 % | SYSTOLIC BLOOD PRESSURE: 107 MMHG | HEART RATE: 77 BPM | DIASTOLIC BLOOD PRESSURE: 66 MMHG | RESPIRATION RATE: 16 BRPM | TEMPERATURE: 99 F

## 2018-06-12 LAB
APPEARANCE UR: SIGNIFICANT CHANGE UP
BILIRUB UR-MCNC: NEGATIVE — SIGNIFICANT CHANGE UP
BLOOD UR QL VISUAL: HIGH
COLOR SPEC: YELLOW — SIGNIFICANT CHANGE UP
GLUCOSE UR-MCNC: NEGATIVE — SIGNIFICANT CHANGE UP
HCG SERPL-ACNC: 3406 MIU/ML — SIGNIFICANT CHANGE UP
KETONES UR-MCNC: NEGATIVE — SIGNIFICANT CHANGE UP
LEUKOCYTE ESTERASE UR-ACNC: HIGH
MUCOUS THREADS # UR AUTO: SIGNIFICANT CHANGE UP
NITRITE UR-MCNC: NEGATIVE — SIGNIFICANT CHANGE UP
PH UR: 7 — SIGNIFICANT CHANGE UP (ref 4.6–8)
PROT UR-MCNC: 30 MG/DL — HIGH
RBC CASTS # UR COMP ASSIST: SIGNIFICANT CHANGE UP (ref 0–?)
SP GR SPEC: 1.02 — SIGNIFICANT CHANGE UP (ref 1–1.04)
SQUAMOUS # UR AUTO: SIGNIFICANT CHANGE UP
UROBILINOGEN FLD QL: NORMAL MG/DL — SIGNIFICANT CHANGE UP
WBC UR QL: SIGNIFICANT CHANGE UP (ref 0–?)

## 2018-06-12 PROCEDURE — 99283 EMERGENCY DEPT VISIT LOW MDM: CPT

## 2018-06-12 NOTE — ED PROVIDER NOTE - PROGRESS NOTE DETAILS
spoke to obgyn resident, updated on updated hcg. she recommends TVUS to be performed this week. pt made appointment with her current obgyn for TVUS this week. discussed return precautions. stable for d/c. - Hemant Guidry MD

## 2018-06-12 NOTE — ED PROVIDER NOTE - OBJECTIVE STATEMENT
23 y/o F a0 w/ no significant PMHx presents to ED for repeat BHCG. Was seen in ER  w/ possible early IU and told to return to ED or OB for repeat BHCG. Pt's OB is Dr. Clements however pt states she will be seeing a new OB, Dr. Aranda. Admits gradual onset of abdominal pain over the last few days. Denies vaginal bleeding, vaginal discharge, urinary sx, fever, chills, and other complaints. LMP . Non-smoker.

## 2018-06-12 NOTE — ED PROVIDER NOTE - MEDICAL DECISION MAKING DETAILS
21 y/o F  seen x4days ago to eval for ectopic, returns for HCG check, pt denies vaginal bleeding and pelvic pain. Plan - to obtain beta HCG and discuss w/ OB/GYN.

## 2018-06-13 LAB — SPECIMEN SOURCE: SIGNIFICANT CHANGE UP

## 2018-06-14 LAB — BACTERIA UR CULT: SIGNIFICANT CHANGE UP

## 2018-07-10 ENCOUNTER — EMERGENCY (EMERGENCY)
Facility: HOSPITAL | Age: 23
LOS: 1 days | Discharge: LEFT BEFORE DISCHARGE | End: 2018-07-10
Admitting: EMERGENCY MEDICINE

## 2018-07-10 VITALS
TEMPERATURE: 98 F | OXYGEN SATURATION: 99 % | HEART RATE: 79 BPM | RESPIRATION RATE: 18 BRPM | DIASTOLIC BLOOD PRESSURE: 52 MMHG | SYSTOLIC BLOOD PRESSURE: 107 MMHG

## 2018-07-10 NOTE — ED ADULT TRIAGE NOTE - CHIEF COMPLAINT QUOTE
Patient is 9 weeks pregnant and has c/o abdominal pain, frequent urination, and vaginal bleeding that started  this morning.

## 2018-07-30 ENCOUNTER — LABORATORY RESULT (OUTPATIENT)
Age: 23
End: 2018-07-30

## 2018-07-31 ENCOUNTER — APPOINTMENT (OUTPATIENT)
Dept: ANTEPARTUM | Facility: CLINIC | Age: 23
End: 2018-07-31
Payer: MEDICAID

## 2018-07-31 ENCOUNTER — ASOB RESULT (OUTPATIENT)
Age: 23
End: 2018-07-31

## 2018-07-31 PROCEDURE — 76801 OB US < 14 WKS SINGLE FETUS: CPT

## 2018-07-31 PROCEDURE — 36416 COLLJ CAPILLARY BLOOD SPEC: CPT

## 2018-07-31 PROCEDURE — 76813 OB US NUCHAL MEAS 1 GEST: CPT

## 2018-08-07 ENCOUNTER — EMERGENCY (EMERGENCY)
Facility: HOSPITAL | Age: 23
LOS: 1 days | Discharge: HOME | End: 2018-08-07
Admitting: EMERGENCY MEDICINE
Payer: MEDICAID

## 2018-08-07 VITALS
DIASTOLIC BLOOD PRESSURE: 59 MMHG | RESPIRATION RATE: 14 BRPM | OXYGEN SATURATION: 100 % | HEART RATE: 76 BPM | SYSTOLIC BLOOD PRESSURE: 96 MMHG | TEMPERATURE: 98 F

## 2018-08-07 PROCEDURE — 99284 EMERGENCY DEPT VISIT MOD MDM: CPT

## 2018-08-07 NOTE — ED ADULT TRIAGE NOTE - CHIEF COMPLAINT QUOTE
"I'm pregnant and I'm having really intense cramping." Pt is  approx. 13 weeks pregnant c/o abdominal cramping x 1 week, worsening tonight, rated 9/10, intermittent with lightheadedness and dizziness. Patient called her OBGYN and was instructed to seek ED eval. Denies nausea, vomiting, diarrhea, dysuria, no PMH.

## 2018-08-08 VITALS
SYSTOLIC BLOOD PRESSURE: 100 MMHG | HEART RATE: 75 BPM | OXYGEN SATURATION: 100 % | DIASTOLIC BLOOD PRESSURE: 61 MMHG | RESPIRATION RATE: 16 BRPM

## 2018-08-08 LAB
ALBUMIN SERPL ELPH-MCNC: 4.1 G/DL — SIGNIFICANT CHANGE UP (ref 3.3–5)
ALP SERPL-CCNC: 37 U/L — LOW (ref 40–120)
ALT FLD-CCNC: 10 U/L — SIGNIFICANT CHANGE UP (ref 4–33)
APPEARANCE UR: SIGNIFICANT CHANGE UP
AST SERPL-CCNC: 14 U/L — SIGNIFICANT CHANGE UP (ref 4–32)
BACTERIA # UR AUTO: SIGNIFICANT CHANGE UP
BASOPHILS # BLD AUTO: 0.03 K/UL — SIGNIFICANT CHANGE UP (ref 0–0.2)
BASOPHILS NFR BLD AUTO: 0.4 % — SIGNIFICANT CHANGE UP (ref 0–2)
BILIRUB SERPL-MCNC: < 0.2 MG/DL — LOW (ref 0.2–1.2)
BILIRUB UR-MCNC: NEGATIVE — SIGNIFICANT CHANGE UP
BLOOD UR QL VISUAL: NEGATIVE — SIGNIFICANT CHANGE UP
BUN SERPL-MCNC: 8 MG/DL — SIGNIFICANT CHANGE UP (ref 7–23)
CALCIUM SERPL-MCNC: 9.5 MG/DL — SIGNIFICANT CHANGE UP (ref 8.4–10.5)
CHLORIDE SERPL-SCNC: 100 MMOL/L — SIGNIFICANT CHANGE UP (ref 98–107)
CO2 SERPL-SCNC: 21 MMOL/L — LOW (ref 22–31)
COLOR SPEC: YELLOW — SIGNIFICANT CHANGE UP
CREAT SERPL-MCNC: 0.45 MG/DL — LOW (ref 0.5–1.3)
EOSINOPHIL # BLD AUTO: 0.14 K/UL — SIGNIFICANT CHANGE UP (ref 0–0.5)
EOSINOPHIL NFR BLD AUTO: 2 % — SIGNIFICANT CHANGE UP (ref 0–6)
GLUCOSE SERPL-MCNC: 92 MG/DL — SIGNIFICANT CHANGE UP (ref 70–99)
GLUCOSE UR-MCNC: NEGATIVE — SIGNIFICANT CHANGE UP
HCG SERPL-ACNC: SIGNIFICANT CHANGE UP MIU/ML
HCT VFR BLD CALC: 26.8 % — LOW (ref 34.5–45)
HGB BLD-MCNC: 9 G/DL — LOW (ref 11.5–15.5)
IMM GRANULOCYTES # BLD AUTO: 0.02 # — SIGNIFICANT CHANGE UP
IMM GRANULOCYTES NFR BLD AUTO: 0.3 % — SIGNIFICANT CHANGE UP (ref 0–1.5)
KETONES UR-MCNC: NEGATIVE — SIGNIFICANT CHANGE UP
LEUKOCYTE ESTERASE UR-ACNC: HIGH
LYMPHOCYTES # BLD AUTO: 2 K/UL — SIGNIFICANT CHANGE UP (ref 1–3.3)
LYMPHOCYTES # BLD AUTO: 28.9 % — SIGNIFICANT CHANGE UP (ref 13–44)
MCHC RBC-ENTMCNC: 28.9 PG — SIGNIFICANT CHANGE UP (ref 27–34)
MCHC RBC-ENTMCNC: 33.6 % — SIGNIFICANT CHANGE UP (ref 32–36)
MCV RBC AUTO: 86.2 FL — SIGNIFICANT CHANGE UP (ref 80–100)
MONOCYTES # BLD AUTO: 0.53 K/UL — SIGNIFICANT CHANGE UP (ref 0–0.9)
MONOCYTES NFR BLD AUTO: 7.7 % — SIGNIFICANT CHANGE UP (ref 2–14)
MUCOUS THREADS # UR AUTO: SIGNIFICANT CHANGE UP
NEUTROPHILS # BLD AUTO: 4.2 K/UL — SIGNIFICANT CHANGE UP (ref 1.8–7.4)
NEUTROPHILS NFR BLD AUTO: 60.7 % — SIGNIFICANT CHANGE UP (ref 43–77)
NITRITE UR-MCNC: NEGATIVE — SIGNIFICANT CHANGE UP
NON-SQ EPI CELLS # UR AUTO: 1 — SIGNIFICANT CHANGE UP
NRBC # FLD: 0 — SIGNIFICANT CHANGE UP
PH UR: 7 — SIGNIFICANT CHANGE UP (ref 4.6–8)
PLATELET # BLD AUTO: 154 K/UL — SIGNIFICANT CHANGE UP (ref 150–400)
PMV BLD: 11.4 FL — SIGNIFICANT CHANGE UP (ref 7–13)
POTASSIUM SERPL-MCNC: 3.5 MMOL/L — SIGNIFICANT CHANGE UP (ref 3.5–5.3)
POTASSIUM SERPL-SCNC: 3.5 MMOL/L — SIGNIFICANT CHANGE UP (ref 3.5–5.3)
PROT SERPL-MCNC: 6.6 G/DL — SIGNIFICANT CHANGE UP (ref 6–8.3)
PROT UR-MCNC: 20 MG/DL — SIGNIFICANT CHANGE UP
RBC # BLD: 3.11 M/UL — LOW (ref 3.8–5.2)
RBC # FLD: 12.5 % — SIGNIFICANT CHANGE UP (ref 10.3–14.5)
RBC CASTS # UR COMP ASSIST: SIGNIFICANT CHANGE UP (ref 0–?)
SODIUM SERPL-SCNC: 134 MMOL/L — LOW (ref 135–145)
SP GR SPEC: 1.02 — SIGNIFICANT CHANGE UP (ref 1–1.04)
SQUAMOUS # UR AUTO: SIGNIFICANT CHANGE UP
UROBILINOGEN FLD QL: NORMAL MG/DL — SIGNIFICANT CHANGE UP
WBC # BLD: 6.92 K/UL — SIGNIFICANT CHANGE UP (ref 3.8–10.5)
WBC # FLD AUTO: 6.92 K/UL — SIGNIFICANT CHANGE UP (ref 3.8–10.5)
WBC CLUMPS #/AREA URNS HPF: PRESENT — HIGH (ref 0–?)
WBC UR QL: HIGH (ref 0–?)
YEAST BUDDING # UR COMP ASSIST: SIGNIFICANT CHANGE UP

## 2018-08-08 PROCEDURE — 76830 TRANSVAGINAL US NON-OB: CPT | Mod: 26

## 2018-08-08 RX ORDER — CEPHALEXIN 500 MG
1 CAPSULE ORAL
Qty: 14 | Refills: 0 | OUTPATIENT
Start: 2018-08-08 | End: 2018-08-14

## 2018-08-08 RX ORDER — SODIUM CHLORIDE 9 MG/ML
1000 INJECTION INTRAMUSCULAR; INTRAVENOUS; SUBCUTANEOUS ONCE
Qty: 0 | Refills: 0 | Status: COMPLETED | OUTPATIENT
Start: 2018-08-08 | End: 2018-08-08

## 2018-08-08 RX ADMIN — SODIUM CHLORIDE 2000 MILLILITER(S): 9 INJECTION INTRAMUSCULAR; INTRAVENOUS; SUBCUTANEOUS at 00:43

## 2018-08-08 RX ADMIN — SODIUM CHLORIDE 1000 MILLILITER(S): 9 INJECTION INTRAMUSCULAR; INTRAVENOUS; SUBCUTANEOUS at 01:30

## 2018-08-08 NOTE — ED ADULT NURSE NOTE - OBJECTIVE STATEMENT
Pt rcvd to INT9 c/o mid-umbilical area abdominal pain "menstrual-like cramping" during pregnancy, pt is 13wks pregnant , LMP 5/5.  Pt states has OB follow up and has had confirmed IUP via sono.  Denies dysuria, hematuria, diarrhea, fevers/chills/weakness, no nausea/vomiting during 2nd trimester.  States had UTI in May but completed treatment w/o residual symptoms. No hx stomach surgeries or significant medical history.  Pt aaox4, ambulatory, well appearing. States abdominal cramping is improved at present from initial presentation to ED. Refusing pain rx at present. Pt VS as noted, BP soft - PA Vargas made aware. IV placed to R AC #20g, labs drawn/sent as ordered, urine specimens collected/sent as ordered. IVF NS Bolus infusing.  Pt awaiting TVUS.  Pt & spouse updated to plan of care, Will CTM.

## 2018-08-08 NOTE — ED PROVIDER NOTE - OBJECTIVE STATEMENT
23 y/o female  13 weeks pregannt (LMP 18) presents to ED c/o abdominal pain x 1 week. Pt. states over past week ahs been experiencing worsening lower abdominal pain which she describes as cramping in nature and became worse today - c/o 9/10 lower abdominal pain with some slight nausea as well. Denies fever chills vaginal bleeding or dc, weakness dizziness urinary symptoms cp sob.

## 2018-08-08 NOTE — ED ADULT NURSE REASSESSMENT NOTE - NS ED NURSE REASSESS COMMENT FT1
Pt reassessed, states no abd pain, appearing well, states feels better.  Vitals as noted, improved.  Discussed results so far, awaiting US TV result.  Will monitor.

## 2018-08-09 LAB — SPECIMEN SOURCE: SIGNIFICANT CHANGE UP

## 2018-08-10 LAB
-  AMPICILLIN: SIGNIFICANT CHANGE UP
-  CIPROFLOXACIN: SIGNIFICANT CHANGE UP
-  NITROFURANTOIN: SIGNIFICANT CHANGE UP
-  TETRACYCLINE: SIGNIFICANT CHANGE UP
-  VANCOMYCIN: SIGNIFICANT CHANGE UP
BACTERIA UR CULT: SIGNIFICANT CHANGE UP
METHOD TYPE: SIGNIFICANT CHANGE UP
ORGANISM # SPEC MICROSCOPIC CNT: SIGNIFICANT CHANGE UP
ORGANISM # SPEC MICROSCOPIC CNT: SIGNIFICANT CHANGE UP

## 2018-09-25 ENCOUNTER — ASOB RESULT (OUTPATIENT)
Age: 23
End: 2018-09-25

## 2018-09-25 ENCOUNTER — APPOINTMENT (OUTPATIENT)
Dept: ANTEPARTUM | Facility: CLINIC | Age: 23
End: 2018-09-25
Payer: COMMERCIAL

## 2018-09-25 PROCEDURE — 76805 OB US >/= 14 WKS SNGL FETUS: CPT

## 2018-09-25 PROCEDURE — 76817 TRANSVAGINAL US OBSTETRIC: CPT

## 2018-10-03 ENCOUNTER — APPOINTMENT (OUTPATIENT)
Dept: ANTEPARTUM | Facility: CLINIC | Age: 23
End: 2018-10-03
Payer: COMMERCIAL

## 2018-10-03 ENCOUNTER — ASOB RESULT (OUTPATIENT)
Age: 23
End: 2018-10-03

## 2018-10-03 PROCEDURE — 76816 OB US FOLLOW-UP PER FETUS: CPT

## 2018-10-24 NOTE — DISCHARGE NOTE ADULT - ABILITY TO HEAR (WITH HEARING AID OR HEARING APPLIANCE IF NORMALLY USED):
Adequate: hears normal conversation without difficulty Consent: Written consent was obtained and risks were reviewed including but not limited to scarring, infection, bleeding, scabbing, incomplete removal, nerve damage and allergy to anesthesia.

## 2018-11-12 ENCOUNTER — OUTPATIENT (OUTPATIENT)
Dept: OUTPATIENT SERVICES | Facility: HOSPITAL | Age: 23
LOS: 1 days | End: 2018-11-12

## 2018-11-12 VITALS — HEIGHT: 63 IN | WEIGHT: 141.1 LBS

## 2018-11-12 DIAGNOSIS — Z3A.00 WEEKS OF GESTATION OF PREGNANCY NOT SPECIFIED: ICD-10-CM

## 2018-11-12 DIAGNOSIS — O26.899 OTHER SPECIFIED PREGNANCY RELATED CONDITIONS, UNSPECIFIED TRIMESTER: ICD-10-CM

## 2018-11-12 LAB
BASOPHILS # BLD AUTO: 0.03 K/UL — SIGNIFICANT CHANGE UP (ref 0–0.2)
BASOPHILS NFR BLD AUTO: 0.4 % — SIGNIFICANT CHANGE UP (ref 0–2)
BLD GP AB SCN SERPL QL: NEGATIVE — SIGNIFICANT CHANGE UP
EOSINOPHIL # BLD AUTO: 0.04 K/UL — SIGNIFICANT CHANGE UP (ref 0–0.5)
EOSINOPHIL NFR BLD AUTO: 0.6 % — SIGNIFICANT CHANGE UP (ref 0–6)
FIBRINOGEN PPP-MCNC: 682 MG/DL — HIGH (ref 350–510)
HCT VFR BLD CALC: 29.1 % — LOW (ref 34.5–45)
HGB BLD-MCNC: 9.6 G/DL — LOW (ref 11.5–15.5)
IMM GRANULOCYTES # BLD AUTO: 0.05 # — SIGNIFICANT CHANGE UP
IMM GRANULOCYTES NFR BLD AUTO: 0.7 % — SIGNIFICANT CHANGE UP (ref 0–1.5)
LYMPHOCYTES # BLD AUTO: 1.11 K/UL — SIGNIFICANT CHANGE UP (ref 1–3.3)
LYMPHOCYTES # BLD AUTO: 15.5 % — SIGNIFICANT CHANGE UP (ref 13–44)
MCHC RBC-ENTMCNC: 29.4 PG — SIGNIFICANT CHANGE UP (ref 27–34)
MCHC RBC-ENTMCNC: 33 % — SIGNIFICANT CHANGE UP (ref 32–36)
MCV RBC AUTO: 89 FL — SIGNIFICANT CHANGE UP (ref 80–100)
MONOCYTES # BLD AUTO: 0.5 K/UL — SIGNIFICANT CHANGE UP (ref 0–0.9)
MONOCYTES NFR BLD AUTO: 7 % — SIGNIFICANT CHANGE UP (ref 2–14)
NEUTROPHILS # BLD AUTO: 5.41 K/UL — SIGNIFICANT CHANGE UP (ref 1.8–7.4)
NEUTROPHILS NFR BLD AUTO: 75.8 % — SIGNIFICANT CHANGE UP (ref 43–77)
NRBC # FLD: 0 — SIGNIFICANT CHANGE UP
PLATELET # BLD AUTO: 163 K/UL — SIGNIFICANT CHANGE UP (ref 150–400)
PMV BLD: 11.6 FL — SIGNIFICANT CHANGE UP (ref 7–13)
RBC # BLD: 3.27 M/UL — LOW (ref 3.8–5.2)
RBC # FLD: 12.1 % — SIGNIFICANT CHANGE UP (ref 10.3–14.5)
RH IG SCN BLD-IMP: POSITIVE — SIGNIFICANT CHANGE UP
WBC # BLD: 7.14 K/UL — SIGNIFICANT CHANGE UP (ref 3.8–10.5)
WBC # FLD AUTO: 7.14 K/UL — SIGNIFICANT CHANGE UP (ref 3.8–10.5)

## 2018-11-12 RX ORDER — SODIUM CHLORIDE 9 MG/ML
1000 INJECTION, SOLUTION INTRAVENOUS ONCE
Qty: 0 | Refills: 0 | Status: DISCONTINUED | OUTPATIENT
Start: 2018-11-12 | End: 2018-11-13

## 2018-11-12 RX ORDER — SODIUM CHLORIDE 9 MG/ML
1000 INJECTION, SOLUTION INTRAVENOUS
Qty: 0 | Refills: 0 | Status: DISCONTINUED | OUTPATIENT
Start: 2018-11-12 | End: 2018-11-13

## 2018-11-12 RX ORDER — OXYTOCIN 10 UNIT/ML
333.33 VIAL (ML) INJECTION
Qty: 20 | Refills: 0 | Status: DISCONTINUED | OUTPATIENT
Start: 2018-11-12 | End: 2018-11-13

## 2018-11-12 RX ORDER — SODIUM CHLORIDE 9 MG/ML
3 INJECTION INTRAMUSCULAR; INTRAVENOUS; SUBCUTANEOUS ONCE
Qty: 0 | Refills: 0 | Status: DISCONTINUED | OUTPATIENT
Start: 2018-11-12 | End: 2018-11-13

## 2018-11-13 ENCOUNTER — TRANSCRIPTION ENCOUNTER (OUTPATIENT)
Age: 23
End: 2018-11-13

## 2018-11-13 DIAGNOSIS — O36.8390 MATERNAL CARE FOR ABNORMALITIES OF THE FETAL HEART RATE OR RHYTHM, UNSPECIFIED TRIMESTER, NOT APPLICABLE OR UNSPECIFIED: ICD-10-CM

## 2018-11-13 LAB — T PALLIDUM AB TITR SER: NEGATIVE — SIGNIFICANT CHANGE UP

## 2018-11-13 NOTE — DISCHARGE NOTE ANTEPARTUM - CARE PLAN
Principal Discharge DX:	Fall, initial encounter  Goal:	Pregnancy wellness  Assessment and plan of treatment:	Return if contractions LOF VB, signs of labor

## 2018-11-13 NOTE — DISCHARGE NOTE ANTEPARTUM - PATIENT PORTAL LINK FT
You can access the [facility] Patient Portal, offered by [facility], by registering with the following website: [email or web address]

## 2018-12-31 NOTE — ED PROVIDER NOTE - RESPIRATORY [-], MLM
Wound Infection   AMBULATORY CARE:   A wound infection  occurs when bacteria enters a break in the skin  The infection may involve just the skin, or affect deeper tissues or organs close to the wound  Signs and symptoms of a wound infection:  Your symptoms may start a few days after you get the wound, or may not occur for a month or two after the wound happens:  · Fever    · Warm, red, painful, or swollen skin near the wound    · Blood or pus coming from the wound     · A foul odor coming from the wound  Seek care immediately if:   · You feel short of breath  · Your heart is beating faster than usual      · You feel confused  · Blood soaks through your bandages  · Your wound comes apart or feels like it is ripping  · You have severe pain  · You see red streaks coming from the infected area  Contact your healthcare provider if:   · You have a fever or chills  · You have more pain, redness, or swelling near your wound  · Your symptoms do not improve  · The skin around your wound feels numb  · You have questions or concerns about your condition or care  Treatment for a wound infection  will depend on how severe the wound is, its location, and whether other areas are affected  It may also depend on your health and the length of time you have had the wound  Ask your healthcare provider about these and other treatments you may need:  · Medicine  will be given to treat the infection and decrease pain and swelling  · Wound care  may be done to clean your wound and help it heal  A wound vacuum may also be placed over your wound to help it heal      · Hyperbaric oxygen therapy  (HBO) may be used to get more oxygen to your tissues to help them heal  The pressurized oxygen is given as you sit in a pressure chamber  · Surgery  may be needed to clean the wound or remove infected or dead tissue  Surgery may also be needed to remove a foreign object    Care for your wound as directed: Keep your wound clean and dry  You may need to cover your wound when you bathe so it does not get wet  Clean your wound as directed with soap and water or wound   Put on new, clean bandages as directed  Change your bandages when they get wet or dirty  Help your wound heal:   · Eat a variety of healthy foods  Examples include fruits, vegetables, whole-grain breads, low-fat dairy products, beans, lean meats, and fish  Healthy foods may help you heal faster  You may also need to take vitamins and minerals  Ask if you need to be on a special diet  · Manage other health conditions  Follow your healthcare provider's directions to manage health conditions that can cause slow wound healing  Examples include high blood pressure and diabetes  · Do not smoke  Nicotine and other chemicals in cigarettes and cigars can cause slow wound healing  Ask your healthcare provider for information if you currently smoke and need help to quit  E-cigarettes or smokeless tobacco still contain nicotine  Talk to your healthcare provider before you use these products  Follow up with your healthcare provider in 1 to 2 days:  Write down your questions so you remember to ask them during your visits  © 2017 [facility] [facility] Information is for End User's use only and may not be sold, redistributed or otherwise used for commercial purposes  All illustrations and images included in CareNotes® are the copyrighted property of A D A M [employer]  or Rolando Joseph  The above information is an  only  It is not intended as medical advice for individual conditions or treatments  Talk to your doctor, nurse or pharmacist before following any medical regimen to see if it is safe and effective for you  Animal Bite   AMBULATORY CARE:   Animal bite  injuries range from shallow cuts to deep, life-threatening wounds  Animal bites occur more often on the hands, arms, legs, and face   Bites from dogs and cats are the most common injuries  Common symptoms include the following:   · Cut or punctured skin     · Skin torn from your body    · Swollen or bruised skin, even if the skin is not broken  Seek care immediately if:   · You have a fever  · Your wound is red, swollen, and draining pus  · You see red streaks on the skin around the wound  · You can no longer move the bitten area  · Your heartbeat and breathing are much faster than usual     · You feel dizzy and confused  Contact your healthcare provider if:   · Your pain does not get better, even after you take pain medicine  · You have nightmares or flashbacks about the animal bite  · You have questions or concerns about your condition or care  Treatment for an animal bite  may include any of the following:  · Irrigation and debridement  may be needed to clean out your wound  Dead, damaged, or infected tissue may be cut away to help your wound heal     · Medicines:      ¨ Antibiotics  prevent or treat a bacterial infection  ¨ Prescription pain medicine  may be given  Ask how to take this medicine safely  ¨ A tetanus vaccine  may be needed to prevent tetanus  Tetanus is a life-threatening bacterial infection that affects the nerves and muscles  The bacteria can be spread through animal bites  ¨ A rabies vaccine  may be needed to prevent rabies  Rabies is a life-threatening viral infection  The virus can be spread through animal bites  · Stitches  may be needed if your wound is large and not infected  · Surgery  may be needed to repair deep injuries or severe wounds  Manage your symptoms:   · Apply antibiotic ointment as directed  This helps prevent infection in minor skin wounds  Antibiotic ointment is available without a prescription  · Keep the wound clean and covered  Wash the wound every day with soap and water or germ-killing cleanser  Ask what kinds of bandages to use  · Apply ice to your wound    Ice helps prevent tissue damage and decreases swelling and pain  Use an ice pack, or put crushed ice in a plastic bag  Cover it with a towel  Apply ice on your wound for 15 to 20 minutes every hour or as directed  · Elevate your wound  Raise your wound above the level of your heart as often as you can  This will help decrease swelling and pain  Prop your wound on pillows or blankets to keep it elevated comfortably  Prevent another animal bite:   · Learn to recognize the signs of a scared pet  Avoid quick, sudden movements  · Do not step between animals that are fighting  · Do not leave a pet alone with a young child  · Do not disturb an animal while it eats, sleeps, or cares for its young  · Do not approach an animal you do not know, especially one that is tied up or caged  · Stay away from animals that seem sick or act strangely  · Do not feed or capture wild animals  Follow up with your healthcare provider as directed:  Write down your questions so you remember to ask them during your visits  © 2017 [address] [facility] Information is for End User's use only and may not be sold, redistributed or otherwise used for commercial purposes  All illustrations and images included in CareNotes® are the copyrighted property of A D A M , Inc  or Rolando Joseph  The above information is an  only  It is not intended as medical advice for individual conditions or treatments  Talk to your doctor, nurse or pharmacist before following any medical regimen to see if it is safe and effective for you  no shortness of breath

## 2019-05-29 NOTE — ED PROVIDER NOTE - NSCAREINITIATED _GEN_ER
Pt called states her insurance will not cover brand name lovenox but will cover generic enoxaparin for over $200 copay out of pocket.  Was told per insurance they will only cover a 30 or 90 day rx.  [facility] pharmacist is recommending getting a 1 month rx for lovenox 2 per day 60 syringes.    S/w Dr. Vaughn who states pt does not need 60 syringes and is willing to give a 2 week supply or 30 syringes of enoxaparin.  30 syringes ordered and escribed to [facility].    Neeta GIPSON RN  EP Triage     Deborah Mullins(NP)

## 2019-10-25 ENCOUNTER — APPOINTMENT (OUTPATIENT)
Dept: ANTEPARTUM | Facility: CLINIC | Age: 24
End: 2019-10-25
Payer: MEDICAID

## 2019-10-25 PROCEDURE — 76813 OB US NUCHAL MEAS 1 GEST: CPT

## 2019-10-25 PROCEDURE — 76816 OB US FOLLOW-UP PER FETUS: CPT

## 2019-11-19 ENCOUNTER — APPOINTMENT (OUTPATIENT)
Dept: OBGYN | Facility: CLINIC | Age: 24
End: 2019-11-19
Payer: MEDICAID

## 2019-11-19 PROCEDURE — 0502F SUBSEQUENT PRENATAL CARE: CPT

## 2019-12-17 ENCOUNTER — APPOINTMENT (OUTPATIENT)
Dept: ANTEPARTUM | Facility: CLINIC | Age: 24
End: 2019-12-17
Payer: MEDICAID

## 2019-12-17 PROCEDURE — 76817 TRANSVAGINAL US OBSTETRIC: CPT

## 2019-12-17 PROCEDURE — 76811 OB US DETAILED SNGL FETUS: CPT

## 2020-01-13 ENCOUNTER — APPOINTMENT (OUTPATIENT)
Dept: OBGYN | Facility: CLINIC | Age: 25
End: 2020-01-13

## 2020-02-17 ENCOUNTER — APPOINTMENT (OUTPATIENT)
Dept: OBGYN | Facility: CLINIC | Age: 25
End: 2020-02-17

## 2020-02-18 ENCOUNTER — OUTPATIENT (OUTPATIENT)
Dept: OUTPATIENT SERVICES | Facility: HOSPITAL | Age: 25
LOS: 1 days | Discharge: HOME | End: 2020-02-18
Payer: MEDICAID

## 2020-02-18 VITALS — HEART RATE: 83 BPM | SYSTOLIC BLOOD PRESSURE: 93 MMHG | DIASTOLIC BLOOD PRESSURE: 52 MMHG

## 2020-02-18 VITALS
SYSTOLIC BLOOD PRESSURE: 105 MMHG | RESPIRATION RATE: 14 BRPM | HEART RATE: 85 BPM | DIASTOLIC BLOOD PRESSURE: 54 MMHG | TEMPERATURE: 98 F

## 2020-02-18 DIAGNOSIS — O26.899 OTHER SPECIFIED PREGNANCY RELATED CONDITIONS, UNSPECIFIED TRIMESTER: ICD-10-CM

## 2020-02-18 DIAGNOSIS — Z98.891 HISTORY OF UTERINE SCAR FROM PREVIOUS SURGERY: Chronic | ICD-10-CM

## 2020-02-18 DIAGNOSIS — Z3A.00 WEEKS OF GESTATION OF PREGNANCY NOT SPECIFIED: ICD-10-CM

## 2020-02-18 PROCEDURE — 59025 FETAL NON-STRESS TEST: CPT | Mod: 26

## 2020-02-18 PROCEDURE — 76816 OB US FOLLOW-UP PER FETUS: CPT | Mod: 26

## 2020-02-18 PROCEDURE — 99212 OFFICE O/P EST SF 10 MIN: CPT | Mod: 25

## 2020-02-18 NOTE — OB PROVIDER TRIAGE NOTE - NSOBPROVIDERNOTE_OBGYN_ALL_OB_FT
25 y/o pt 29.1 weeks  presents to triage with c/o "baby moving non stop". pt expresses concern that since baby is moving too much, it might affect her previous incision. pt denies bleeding, LOF, and contractions. pt denies n/v/d, fever or chills. pt endorses +fetal movement.   Ap uncomplicated thus far    NKDA  PMH: denies  PSH: denies  OB:  primary  failure to progress 2019 FT   GYN: denies  Social hx: denies  Medications: PNV    General: A&O x3  Lungs: anterior and posterior lungs clear upon auscultation bilaterally  Cardiac: R/R/R  Abdomen: soft, non tender    NST in progress 25 y/o pt 29.1 weeks  presents to triage with c/o "baby moving non stop". pt expresses concern that since baby is moving too much, it might affect her previous incision. pt denies bleeding, LOF, and contractions. pt denies n/v/d, fever or chills. pt endorses +fetal movement.   Ap uncomplicated thus far    NKDA  PMH: denies  PSH: denies  OB:  primary  failure to progress 2019 FT   GYN: denies  Social hx: denies  Medications: PNV    General: A&O x3  Lungs: anterior and posterior lungs clear upon auscultation bilaterally  Cardiac: R/R/R  Abdomen: soft, non tender    NST reactive with moderate variability, cat 1  toco no contractions noted    TAS @1630: BPP 8/8, VIKAS 13.95cm, posterior placenta, vertex presentation      maternal and fetal status reassuring   d/w with Dr Wharton  pt cleared for discharge  pt to follow up with scheduled appointment  fetal kick counts reviewed  labor precautions reviewed

## 2020-02-18 NOTE — OB PROVIDER TRIAGE NOTE - NSHPPHYSICALEXAM_GEN_ALL_CORE
Vital Signs Last 24 Hrs  T(C): 36.7 (18 Feb 2020 14:30), Max: 36.7 (18 Feb 2020 14:30)  T(F): 98.1 (18 Feb 2020 14:30), Max: 98.1 (18 Feb 2020 14:30)  HR: 83 (18 Feb 2020 15:25) (83 - 85)  BP: 93/52 (18 Feb 2020 15:25) (93/52 - 105/54)  BP(mean): --  RR: 14 (18 Feb 2020 14:30) (14 - 14)  SpO2: --    General: A&O x3  Lungs: anterior and posterior lungs clear upon auscultation bilaterally  Cardiac: R/R/R  Abdomen: soft, non tender    NST in progress Vital Signs Last 24 Hrs  T(C): 36.7 (18 Feb 2020 14:30), Max: 36.7 (18 Feb 2020 14:30)  T(F): 98.1 (18 Feb 2020 14:30), Max: 98.1 (18 Feb 2020 14:30)  HR: 83 (18 Feb 2020 15:25) (83 - 85)  BP: 93/52 (18 Feb 2020 15:25) (93/52 - 105/54)  BP(mean): --  RR: 14 (18 Feb 2020 14:30) (14 - 14)  SpO2: --    General: A&O x3  Lungs: anterior and posterior lungs clear upon auscultation bilaterally  Cardiac: R/R/R  Abdomen: soft, non tender    NST reactive with moderate variability, cat 1  toco no contractions noted      TAS @1630: BPP 8/8, VIKAS 13.95cm, posterior placenta, vertex presentation

## 2020-02-18 NOTE — OB PROVIDER TRIAGE NOTE - HISTORY OF PRESENT ILLNESS
25 y/o pt 29.1 weeks  presents to triage with c/o "baby moving non stop". pt expresses concern that since baby is moving too much, it might affect her previous incision. pt denies bleeding, LOF, and contractions. pt denies n/v/d, fever or chills. pt endorses +fetal movement.   Ap uncomplicated thus far    NKDA  PMH: denies  PSH: denies  OB:  primary  failure to progress 2019 FT   GYN: denies  Social hx: denies  Medications: PNV

## 2020-02-21 ENCOUNTER — APPOINTMENT (OUTPATIENT)
Dept: OBGYN | Facility: CLINIC | Age: 25
End: 2020-02-21

## 2020-03-06 ENCOUNTER — APPOINTMENT (OUTPATIENT)
Dept: OBGYN | Facility: CLINIC | Age: 25
End: 2020-03-06

## 2020-03-23 ENCOUNTER — APPOINTMENT (OUTPATIENT)
Dept: OBGYN | Facility: CLINIC | Age: 25
End: 2020-03-23

## 2020-03-30 ENCOUNTER — APPOINTMENT (OUTPATIENT)
Dept: OBGYN | Facility: CLINIC | Age: 25
End: 2020-03-30

## 2020-04-07 ENCOUNTER — APPOINTMENT (OUTPATIENT)
Dept: OBGYN | Facility: CLINIC | Age: 25
End: 2020-04-07

## 2020-04-13 ENCOUNTER — APPOINTMENT (OUTPATIENT)
Dept: ANTEPARTUM | Facility: CLINIC | Age: 25
End: 2020-04-13
Payer: SELF-PAY

## 2020-04-13 PROCEDURE — 76819 FETAL BIOPHYS PROFIL W/O NST: CPT

## 2020-04-13 PROCEDURE — 76816 OB US FOLLOW-UP PER FETUS: CPT

## 2020-04-14 ENCOUNTER — TRANSCRIPTION ENCOUNTER (OUTPATIENT)
Age: 25
End: 2020-04-14

## 2020-04-15 ENCOUNTER — TRANSCRIPTION ENCOUNTER (OUTPATIENT)
Age: 25
End: 2020-04-15

## 2020-04-15 ENCOUNTER — INPATIENT (INPATIENT)
Facility: HOSPITAL | Age: 25
LOS: 2 days | Discharge: HOME | End: 2020-04-18
Attending: OBSTETRICS & GYNECOLOGY | Admitting: OBSTETRICS & GYNECOLOGY

## 2020-04-15 VITALS — TEMPERATURE: 97 F

## 2020-04-15 DIAGNOSIS — O26.899 OTHER SPECIFIED PREGNANCY RELATED CONDITIONS, UNSPECIFIED TRIMESTER: ICD-10-CM

## 2020-04-15 DIAGNOSIS — Z98.891 HISTORY OF UTERINE SCAR FROM PREVIOUS SURGERY: Chronic | ICD-10-CM

## 2020-04-15 DIAGNOSIS — Z3A.00 WEEKS OF GESTATION OF PREGNANCY NOT SPECIFIED: ICD-10-CM

## 2020-04-15 LAB
BASOPHILS # BLD AUTO: 0.04 K/UL — SIGNIFICANT CHANGE UP (ref 0–0.2)
BASOPHILS NFR BLD AUTO: 0.5 % — SIGNIFICANT CHANGE UP (ref 0–2)
BLD GP AB SCN SERPL QL: NEGATIVE — SIGNIFICANT CHANGE UP
EOSINOPHIL # BLD AUTO: 0.01 K/UL — SIGNIFICANT CHANGE UP (ref 0–0.5)
EOSINOPHIL NFR BLD AUTO: 0.1 % — SIGNIFICANT CHANGE UP (ref 0–6)
HCT VFR BLD CALC: 30.7 % — LOW (ref 34.5–45)
HGB BLD-MCNC: 10 G/DL — LOW (ref 11.5–15.5)
IMM GRANULOCYTES NFR BLD AUTO: 0.9 % — SIGNIFICANT CHANGE UP (ref 0–1.5)
LYMPHOCYTES # BLD AUTO: 1.13 K/UL — SIGNIFICANT CHANGE UP (ref 1–3.3)
LYMPHOCYTES # BLD AUTO: 14 % — SIGNIFICANT CHANGE UP (ref 13–44)
MCHC RBC-ENTMCNC: 28.2 PG — SIGNIFICANT CHANGE UP (ref 27–34)
MCHC RBC-ENTMCNC: 32.6 % — SIGNIFICANT CHANGE UP (ref 32–36)
MCV RBC AUTO: 86.5 FL — SIGNIFICANT CHANGE UP (ref 80–100)
MONOCYTES # BLD AUTO: 0.8 K/UL — SIGNIFICANT CHANGE UP (ref 0–0.9)
MONOCYTES NFR BLD AUTO: 9.9 % — SIGNIFICANT CHANGE UP (ref 2–14)
NEUTROPHILS # BLD AUTO: 6.04 K/UL — SIGNIFICANT CHANGE UP (ref 1.8–7.4)
NEUTROPHILS NFR BLD AUTO: 74.6 % — SIGNIFICANT CHANGE UP (ref 43–77)
NRBC # FLD: 0 K/UL — SIGNIFICANT CHANGE UP (ref 0–0)
PLATELET # BLD AUTO: 159 K/UL — SIGNIFICANT CHANGE UP (ref 150–400)
PMV BLD: 11.7 FL — SIGNIFICANT CHANGE UP (ref 7–13)
RBC # BLD: 3.55 M/UL — LOW (ref 3.8–5.2)
RBC # FLD: 12.3 % — SIGNIFICANT CHANGE UP (ref 10.3–14.5)
RH IG SCN BLD-IMP: POSITIVE — SIGNIFICANT CHANGE UP
RH IG SCN BLD-IMP: POSITIVE — SIGNIFICANT CHANGE UP
SARS-COV-2 RNA SPEC QL NAA+PROBE: SIGNIFICANT CHANGE UP
WBC # BLD: 8.09 K/UL — SIGNIFICANT CHANGE UP (ref 3.8–10.5)
WBC # FLD AUTO: 8.09 K/UL — SIGNIFICANT CHANGE UP (ref 3.8–10.5)

## 2020-04-15 RX ORDER — SODIUM CHLORIDE 9 MG/ML
1000 INJECTION, SOLUTION INTRAVENOUS
Refills: 0 | Status: DISCONTINUED | OUTPATIENT
Start: 2020-04-15 | End: 2020-04-16

## 2020-04-15 RX ORDER — SODIUM CHLORIDE 9 MG/ML
1000 INJECTION, SOLUTION INTRAVENOUS
Refills: 0 | Status: DISCONTINUED | OUTPATIENT
Start: 2020-04-15 | End: 2020-04-15

## 2020-04-15 RX ORDER — OXYCODONE HYDROCHLORIDE 5 MG/1
5 TABLET ORAL
Refills: 0 | Status: COMPLETED | OUTPATIENT
Start: 2020-04-15 | End: 2020-04-22

## 2020-04-15 RX ORDER — OXYTOCIN 10 UNIT/ML
333.33 VIAL (ML) INJECTION
Qty: 20 | Refills: 0 | Status: DISCONTINUED | OUTPATIENT
Start: 2020-04-15 | End: 2020-04-16

## 2020-04-15 RX ORDER — MAGNESIUM HYDROXIDE 400 MG/1
30 TABLET, CHEWABLE ORAL
Refills: 0 | Status: DISCONTINUED | OUTPATIENT
Start: 2020-04-15 | End: 2020-04-18

## 2020-04-15 RX ORDER — CITRIC ACID/SODIUM CITRATE 300-500 MG
30 SOLUTION, ORAL ORAL ONCE
Refills: 0 | Status: COMPLETED | OUTPATIENT
Start: 2020-04-15 | End: 2020-04-15

## 2020-04-15 RX ORDER — SIMETHICONE 80 MG/1
80 TABLET, CHEWABLE ORAL EVERY 4 HOURS
Refills: 0 | Status: DISCONTINUED | OUTPATIENT
Start: 2020-04-15 | End: 2020-04-18

## 2020-04-15 RX ORDER — NALOXONE HYDROCHLORIDE 4 MG/.1ML
0.1 SPRAY NASAL
Refills: 0 | Status: DISCONTINUED | OUTPATIENT
Start: 2020-04-15 | End: 2020-04-15

## 2020-04-15 RX ORDER — KETOROLAC TROMETHAMINE 30 MG/ML
30 SYRINGE (ML) INJECTION EVERY 6 HOURS
Refills: 0 | Status: DISCONTINUED | OUTPATIENT
Start: 2020-04-15 | End: 2020-04-16

## 2020-04-15 RX ORDER — SODIUM CHLORIDE 9 MG/ML
1000 INJECTION, SOLUTION INTRAVENOUS ONCE
Refills: 0 | Status: COMPLETED | OUTPATIENT
Start: 2020-04-15 | End: 2020-04-15

## 2020-04-15 RX ORDER — OXYCODONE HYDROCHLORIDE 5 MG/1
5 TABLET ORAL
Refills: 0 | Status: DISCONTINUED | OUTPATIENT
Start: 2020-04-15 | End: 2020-04-15

## 2020-04-15 RX ORDER — GLYCERIN ADULT
1 SUPPOSITORY, RECTAL RECTAL AT BEDTIME
Refills: 0 | Status: DISCONTINUED | OUTPATIENT
Start: 2020-04-15 | End: 2020-04-18

## 2020-04-15 RX ORDER — IBUPROFEN 200 MG
600 TABLET ORAL EVERY 6 HOURS
Refills: 0 | Status: COMPLETED | OUTPATIENT
Start: 2020-04-15 | End: 2021-03-14

## 2020-04-15 RX ORDER — OXYTOCIN 10 UNIT/ML
333.33 VIAL (ML) INJECTION
Qty: 20 | Refills: 0 | Status: DISCONTINUED | OUTPATIENT
Start: 2020-04-15 | End: 2020-04-15

## 2020-04-15 RX ORDER — MORPHINE SULFATE 50 MG/1
0.1 CAPSULE, EXTENDED RELEASE ORAL ONCE
Refills: 0 | Status: DISCONTINUED | OUTPATIENT
Start: 2020-04-15 | End: 2020-04-15

## 2020-04-15 RX ORDER — OXYCODONE HYDROCHLORIDE 5 MG/1
10 TABLET ORAL
Refills: 0 | Status: DISCONTINUED | OUTPATIENT
Start: 2020-04-15 | End: 2020-04-15

## 2020-04-15 RX ORDER — HYDROMORPHONE HYDROCHLORIDE 2 MG/ML
1 INJECTION INTRAMUSCULAR; INTRAVENOUS; SUBCUTANEOUS
Refills: 0 | Status: DISCONTINUED | OUTPATIENT
Start: 2020-04-15 | End: 2020-04-15

## 2020-04-15 RX ORDER — LANOLIN
1 OINTMENT (GRAM) TOPICAL EVERY 6 HOURS
Refills: 0 | Status: DISCONTINUED | OUTPATIENT
Start: 2020-04-15 | End: 2020-04-18

## 2020-04-15 RX ORDER — TETANUS TOXOID, REDUCED DIPHTHERIA TOXOID AND ACELLULAR PERTUSSIS VACCINE, ADSORBED 5; 2.5; 8; 8; 2.5 [IU]/.5ML; [IU]/.5ML; UG/.5ML; UG/.5ML; UG/.5ML
0.5 SUSPENSION INTRAMUSCULAR ONCE
Refills: 0 | Status: DISCONTINUED | OUTPATIENT
Start: 2020-04-15 | End: 2020-04-18

## 2020-04-15 RX ORDER — DIPHENHYDRAMINE HCL 50 MG
25 CAPSULE ORAL EVERY 6 HOURS
Refills: 0 | Status: DISCONTINUED | OUTPATIENT
Start: 2020-04-15 | End: 2020-04-18

## 2020-04-15 RX ORDER — OXYCODONE HYDROCHLORIDE 5 MG/1
5 TABLET ORAL ONCE
Refills: 0 | Status: DISCONTINUED | OUTPATIENT
Start: 2020-04-15 | End: 2020-04-18

## 2020-04-15 RX ORDER — FAMOTIDINE 10 MG/ML
20 INJECTION INTRAVENOUS ONCE
Refills: 0 | Status: COMPLETED | OUTPATIENT
Start: 2020-04-15 | End: 2020-04-15

## 2020-04-15 RX ORDER — ONDANSETRON 8 MG/1
4 TABLET, FILM COATED ORAL EVERY 6 HOURS
Refills: 0 | Status: DISCONTINUED | OUTPATIENT
Start: 2020-04-15 | End: 2020-04-15

## 2020-04-15 RX ORDER — HEPARIN SODIUM 5000 [USP'U]/ML
5000 INJECTION INTRAVENOUS; SUBCUTANEOUS EVERY 12 HOURS
Refills: 0 | Status: DISCONTINUED | OUTPATIENT
Start: 2020-04-15 | End: 2020-04-18

## 2020-04-15 RX ORDER — ACETAMINOPHEN 500 MG
975 TABLET ORAL
Refills: 0 | Status: DISCONTINUED | OUTPATIENT
Start: 2020-04-15 | End: 2020-04-18

## 2020-04-15 RX ORDER — METOCLOPRAMIDE HCL 10 MG
10 TABLET ORAL ONCE
Refills: 0 | Status: COMPLETED | OUTPATIENT
Start: 2020-04-15 | End: 2020-04-15

## 2020-04-15 RX ADMIN — Medication 30 MILLILITER(S): at 18:47

## 2020-04-15 RX ADMIN — Medication 10 MILLIGRAM(S): at 18:47

## 2020-04-15 RX ADMIN — SODIUM CHLORIDE 2000 MILLILITER(S): 9 INJECTION, SOLUTION INTRAVENOUS at 18:49

## 2020-04-15 RX ADMIN — FAMOTIDINE 20 MILLIGRAM(S): 10 INJECTION INTRAVENOUS at 18:46

## 2020-04-15 NOTE — OB NEONATOLOGY/PEDIATRICIAN DELIVERY SUMMARY - NSPEDSNEONOTESA_OBGYN_ALL_OB_FT
Baby is a 37.2 week GA male born to a 23 y/o  mother via repeat CS after going into labor. Maternal history uncomplicated. Pregnancy uncomplicated. Maternal blood O+. Prenatal labs neg/neg/NR/I. GBS unknown. ROM at time of delivery (<18hrs) with clear fluid. Baby born vigorous and crying spontaneously. DCC x 30 seconds. Warmed, dried, stimulated, suctioned. Apgars 9/9. Mom defers hep B, unsure about circumcision, plans to initiate breastfeeding. Highest maternal temp 36.6 C, EOS 0.06.    Baby stable for transfer to  nursery. Parents updated.

## 2020-04-15 NOTE — DISCHARGE NOTE OB - MEDICATION SUMMARY - MEDICATIONS TO TAKE
I will START or STAY ON the medications listed below when I get home from the hospital:    acetaminophen 325 mg oral tablet  -- 3 tab(s) by mouth   -- Indication: For Pain, as needed    ibuprofen 600 mg oral tablet  -- 1 tab(s) by mouth every 6 hours  -- Indication: For Pain, as needed    PNV Prenatal oral tablet  -- 1 tab(s) by mouth once a day  -- Indication: For supplement

## 2020-04-15 NOTE — DISCHARGE NOTE OB - PATIENT PORTAL LINK FT
You can access the FollowMyHealth Patient Portal offered by [facility] by registering at the following website: [email or web address]. By joining [employer]’s FollowMyHealth portal, you will also be able to view your health information using other applications (apps) compatible with our system.

## 2020-04-15 NOTE — OB PROVIDER TRIAGE NOTE - NSOBPROVIDERNOTE_OBGYN_ALL_OB_FT
25 y/o   @ 38.2 wks gestation presents with c/o painful uterine  contractions every 2-3 minutes since 1700 denies any LOF or VB reports +FM denies any n/v/d denies any fever or chills ap care uncomplicated thus far denies any recent sick exposure   sched rpt  2020      abdomen: soft, nt on palp  SVE: /-3  cat 1 FHT  toco: every 2-3 minutes  T(C): --  HR: 106 (04-15-20 @ 18:08) (106 - 106)  BP: 117/62 (04-15-20 @ 18:08) (117/62 - 117/62)  RR: --  SpO2: --  cat 1 FHT  toco: every 2 minutes   d/w dr armendariz  admit to l&d  rpt  in labor @ 38.2 wks gestation   see admission orders    NKA  med hx: denies  surg hx:   2019 primary  arrest of dilatation 7#11  gyn hx: denies  ob hx:   2019 primary  arrest of dilatation 7#11  meds: PNV      ht: 5'3  wt:168    last meal: 1200

## 2020-04-15 NOTE — DISCHARGE NOTE OB - CARE PLAN
Principal Discharge DX:	Previous  section  Goal:	return to normal health  Assessment and plan of treatment:	nothing in the vagina x 6 weeks  no heavy lifting x 6 weeks

## 2020-04-15 NOTE — OB PROVIDER DELIVERY SUMMARY - NSPROVIDERDELIVERYNOTE_OBGYN_ALL_OB_FT
Gravid uterus with grossly normal bilateral fallopian tubes and ovaries. Adhesions noted in subcutaneous tissue and fascia. Upon entering peritoneum no adhesions noted. Liveborn male fetus with apgars 9/9. Fibrillar placed over hysterotomy, which was close in two layers of Caprosyn.  EBL: 800cc   IVF: 2300cc  UOP: 300cc Gravid uterus with grossly normal bilateral fallopian tubes and ovaries. Adhesions noted in subcutaneous tissue and fascia. Upon entering peritoneum no adhesions noted. Liveborn male fetus with apgars 9/9. Fibrillar placed over hysterotomy, which was close in two layers of Caprosyn.  EBL: 800cc   IVF: 2300cc  UOP: 300cc      Attending note  Agree with above  rLTCS in labor   Please see dictation for details     Marina Rosales

## 2020-04-15 NOTE — OB PROVIDER H&P - ASSESSMENT
23 y/o   @ 38.2 wks gestation presents with c/o painful uterine  contractions every 2-3 minutes since 1700 denies any LOF or VB reports +FM denies any n/v/d denies any fever or chills ap care uncomplicated thus far denies any recent sick exposure   sched rpt  2020      abdomen: soft, nt on palp  SVE: /-3  cat 1 FHT  toco: every 2-3 minutes  T(C): --  HR: 106 (04-15-20 @ 18:08) (106 - 106)  BP: 117/62 (04-15-20 @ 18:08) (117/62 - 117/62)  RR: --  SpO2: --  cat 1 FHT  toco: every 2 minutes   d/w dr armendariz  admit to l&d  rpt  in labor @ 38.2 wks gestation   see admission orders    NKA  med hx: denies  surg hx:   2019 primary  arrest of dilatation 7#11  gyn hx: denies  ob hx:   2019 primary  arrest of dilatation 7#11  meds: PNV      ht: 5'3  wt:168    last meal: 1200

## 2020-04-15 NOTE — DISCHARGE NOTE OB - HOSPITAL COURSE
She presented in labor and previous c/section.   She had uncomplicated repeat low transverse c/section

## 2020-04-15 NOTE — DISCHARGE NOTE OB - CARE PROVIDER_API CALL
Santos Sullivan)  MaternalFetal Medicine; Obstetrics and Gynecology  [address] [place] [address]  Phone: [phone]  Fax: [phone]  Follow Up Time:

## 2020-04-15 NOTE — OB RN DELIVERY SUMMARY - NS_SEPSISRSKCALC_OBGYN_ALL_OB_FT
EOS calculated successfully. EOS Risk Factor: 0.5/1000 live births ([facility] national incidence); GA=38w2d; Temp=97.9; ROM=0.05; GBS='Unknown'; Antibiotics='No antibiotics or any antibiotics < 2 hrs prior to birth'

## 2020-04-16 LAB
BASOPHILS # BLD AUTO: 0.03 K/UL — SIGNIFICANT CHANGE UP (ref 0–0.2)
BASOPHILS NFR BLD AUTO: 0.2 % — SIGNIFICANT CHANGE UP (ref 0–2)
EOSINOPHIL # BLD AUTO: 0.01 K/UL — SIGNIFICANT CHANGE UP (ref 0–0.5)
EOSINOPHIL NFR BLD AUTO: 0.1 % — SIGNIFICANT CHANGE UP (ref 0–6)
HCT VFR BLD CALC: 28.8 % — LOW (ref 34.5–45)
HGB BLD-MCNC: 9.5 G/DL — LOW (ref 11.5–15.5)
IMM GRANULOCYTES NFR BLD AUTO: 0.6 % — SIGNIFICANT CHANGE UP (ref 0–1.5)
LYMPHOCYTES # BLD AUTO: 1.21 K/UL — SIGNIFICANT CHANGE UP (ref 1–3.3)
LYMPHOCYTES # BLD AUTO: 9.6 % — LOW (ref 13–44)
MCHC RBC-ENTMCNC: 28.6 PG — SIGNIFICANT CHANGE UP (ref 27–34)
MCHC RBC-ENTMCNC: 33 % — SIGNIFICANT CHANGE UP (ref 32–36)
MCV RBC AUTO: 86.7 FL — SIGNIFICANT CHANGE UP (ref 80–100)
MONOCYTES # BLD AUTO: 1.06 K/UL — HIGH (ref 0–0.9)
MONOCYTES NFR BLD AUTO: 8.4 % — SIGNIFICANT CHANGE UP (ref 2–14)
NEUTROPHILS # BLD AUTO: 10.18 K/UL — HIGH (ref 1.8–7.4)
NEUTROPHILS NFR BLD AUTO: 81.1 % — HIGH (ref 43–77)
NRBC # FLD: 0.02 K/UL — SIGNIFICANT CHANGE UP (ref 0–0)
PLATELET # BLD AUTO: 152 K/UL — SIGNIFICANT CHANGE UP (ref 150–400)
PMV BLD: 12.2 FL — SIGNIFICANT CHANGE UP (ref 7–13)
RBC # BLD: 3.32 M/UL — LOW (ref 3.8–5.2)
RBC # FLD: 12.6 % — SIGNIFICANT CHANGE UP (ref 10.3–14.5)
T PALLIDUM AB TITR SER: NEGATIVE — SIGNIFICANT CHANGE UP
WBC # BLD: 12.57 K/UL — HIGH (ref 3.8–10.5)
WBC # FLD AUTO: 12.57 K/UL — HIGH (ref 3.8–10.5)

## 2020-04-16 RX ORDER — FERROUS SULFATE 325(65) MG
325 TABLET ORAL DAILY
Refills: 0 | Status: DISCONTINUED | OUTPATIENT
Start: 2020-04-16 | End: 2020-04-18

## 2020-04-16 RX ORDER — SODIUM CHLORIDE 9 MG/ML
500 INJECTION, SOLUTION INTRAVENOUS ONCE
Refills: 0 | Status: COMPLETED | OUTPATIENT
Start: 2020-04-16 | End: 2020-04-16

## 2020-04-16 RX ORDER — SENNA PLUS 8.6 MG/1
1 TABLET ORAL
Refills: 0 | Status: DISCONTINUED | OUTPATIENT
Start: 2020-04-16 | End: 2020-04-18

## 2020-04-16 RX ADMIN — Medication 30 MILLIGRAM(S): at 09:28

## 2020-04-16 RX ADMIN — Medication 30 MILLIGRAM(S): at 03:08

## 2020-04-16 RX ADMIN — Medication 975 MILLIGRAM(S): at 22:00

## 2020-04-16 RX ADMIN — HEPARIN SODIUM 5000 UNIT(S): 5000 INJECTION INTRAVENOUS; SUBCUTANEOUS at 18:20

## 2020-04-16 RX ADMIN — HEPARIN SODIUM 5000 UNIT(S): 5000 INJECTION INTRAVENOUS; SUBCUTANEOUS at 03:08

## 2020-04-16 RX ADMIN — Medication 30 MILLIGRAM(S): at 15:30

## 2020-04-16 RX ADMIN — SODIUM CHLORIDE 500 MILLILITER(S): 9 INJECTION, SOLUTION INTRAVENOUS at 06:31

## 2020-04-16 NOTE — PROGRESS NOTE ADULT - SUBJECTIVE AND OBJECTIVE BOX
Acute OB Pain Service Note    POD#1 S/P  Section    Pt is doing well.      The patient received Duramorph 0.1 mg via intrathecal injection on 4/15/20.    Intrathecal injection site is: without erythema, discharge, edema, and is nontender.    The patient is afebrile, alert and oriented    Side Effects: No PONV, No Pruritis, No Numbness, No Neuromuscular deficits noted.  Continue prn p.o. analgesics

## 2020-04-16 NOTE — PROGRESS NOTE ADULT - SUBJECTIVE AND OBJECTIVE BOX
Postop Day  1 s/p   C- Section    THERAPY:  [X] Spinal morphine   [  ] Epidural morphine   [  ] IV PCA Hydromorphone 1 mg/ml      Sedation Score:	  [X] Alert	    [  ] Drowsy        [  ] Arousable	[  ] Asleep	[  ] Unresponsive    Side Effects:	  [X] None	     [  ] Nausea        [  ] Pruritus        [  ] Weakness   [  ] Numbness        ASSESSMENT/ PLAN   [   ] Discontinue         [X] Continue p.o. meds  [ x ]Documentation and Verification of current medications     Comments:

## 2020-04-16 NOTE — PROVIDER CONTACT NOTE (OTHER) - ASSESSMENT
pt has positive orthostats, see flowsheet. pt reports dizziness while getting OOB, states she has not eaten or drank anything since yesterday.

## 2020-04-16 NOTE — PROGRESS NOTE ADULT - SUBJECTIVE AND OBJECTIVE BOX
S: Patient doing well. No complaints. Minimal lochia. Pain controlled.    O: Vital Signs Last 24 Hrs  T(C): 37 (2020 05:48), Max: 37 (2020 02:55)  T(F): 98.6 (2020 05:48), Max: 98.6 (2020 02:55)  HR: 72 (2020 05:48) (58 - 106)  BP: 117/50 (2020 05:48) (84/62 - 117/62)  BP(mean): 68 (2020 00:00) (56 - 77)  RR: 16 (2020 05:48) (13 - 20)  SpO2: 98% (2020 05:48) (97% - 100%)    Gen: NAD  Abd: soft, Nontender, Nondistended, fundus firm  Ext: no tendern, mild edema    Labs:                        9.5    12.57 )-----------( 152      ( 2020 06:00 )             28.8       A: 24y POD#1 s/p Repeat  section doing well.    Plan:  Routine postpartum care  Encouraged out of bed  Regular diet

## 2020-04-16 NOTE — PROGRESS NOTE ADULT - SUBJECTIVE AND OBJECTIVE BOX
SUBJECTIVE:    Pain: Controlled    Complaints: None    MILESTONES:    Alert and Oriented x 3  [ x ]  Out of bed/ ambulating. [ x ]  Flatus:   Positive [ x ]  Negative [  ]  Bowel movement  [  ] Positive [  ] Negative   Voiding [x  ] Due to void [  ]   Goode/Indwelling catheter in place [  ]  Diet: Regular [ x ]  Clears [  ]  NPO [  ]    Infant feeding:  Breast [  ]   Bottle [  ]  Both [ X ]  Feeding related issues and/or concerns:      OBJECTIVE:  T(C): 37 (20 @ 05:48), Max: 37 (20 @ 02:55)  HR: 72 (20 @ 05:48) (58 - 106)  BP: 117/50 (20 @ 05:48) (84/62 - 117/62)  RR: 16 (20 @ 05:48) (13 - 20)  SpO2: 98% (20 @ 05:48) (97% - 100%)  Wt(kg): --                        9.5    12.57 )-----------( 152      ( 2020 06:00 )             28.8           Blood Type: O Positive            MEDICATIONS  (STANDING):  acetaminophen   Tablet .. 975 milliGRAM(s) Oral <User Schedule>  diphtheria/tetanus/pertussis (acellular) Vaccine (ADAcel) 0.5 milliLiter(s) IntraMuscular once  ferrous    sulfate 325 milliGRAM(s) Oral daily  heparin  Injectable 5000 Unit(s) SubCutaneous every 12 hours  ibuprofen  Tablet. 600 milliGRAM(s) Oral every 6 hours  ketorolac   Injectable 30 milliGRAM(s) IV Push every 6 hours  prenatal multivitamin 1 Tablet(s) Oral daily    MEDICATIONS  (PRN):  diphenhydrAMINE 25 milliGRAM(s) Oral every 6 hours PRN Itching  glycerin Suppository - Adult 1 Suppository(s) Rectal at bedtime PRN Constipation  lanolin Ointment 1 Application(s) Topical every 6 hours PRN Sore Nipples  magnesium hydroxide Suspension 30 milliLiter(s) Oral two times a day PRN Constipation  oxyCODONE    IR 5 milliGRAM(s) Oral every 3 hours PRN Moderate to Severe Pain (4-10)  oxyCODONE    IR 5 milliGRAM(s) Oral once PRN Moderate to Severe Pain (4-10)  senna 1 Tablet(s) Oral two times a day PRN Constipation  simethicone 80 milliGRAM(s) Chew every 4 hours PRN Gas        ASSESSMENT:    24y     G 2     P         PO Day#  1        Delivery: Primary [  ]    Repeat [ X ]       EBL - 800                                  Indication of procedure: Previous C/S in Labor    Condition: Stable    Past Medical History significant for: HPI:  " i'm having painful uterine contractions every 2-3 minutes since 1700 " (15 Apr 2020 18:16)      Current Issues:    Breasts:  Soft [x  ]   Engorged [  ]  Nipples:  Abdomen: Soft [ x ]   Distended [  ] Nontender [  ]     Bowel sounds :  Present [  ]  Absent [  ]   Fundus:  Firm [x  ]  Boggy [  ]    Abdominal incision: Clean, Dry and Intact [x  ]  Staples [  ] Steri Strips [ X ] Dermabond [  ] Sutures [  ]      - Dressing Removed    Patient wearing abdominal binder for support.    Vaginal: Lochia:  Heavy [  ]  Moderate [ x ]   Scant [  ]    Extremities: Edema [ X ] Negative Jeni's Sign [ X ] Nontender Vamsi  [ x ] Positive pedal pulses [  ]    Other relevant physical exam findings:      PLAN:    Plan: Increase ambulation, analgesia PRN and pain medication protocol standing oxycodone, ibuprofen and acetaminophen.    Diet: Regular diet    Continue routine post-operative and postpartum care.     Discharge Planning [ x ]    For discharge Today  [    ]    Consults:  Social Work [  ]  Lactation [ x ]  Other [         ]

## 2020-04-17 LAB
BASOPHILS # BLD AUTO: 0.03 K/UL — SIGNIFICANT CHANGE UP (ref 0–0.2)
BASOPHILS NFR BLD AUTO: 0.3 % — SIGNIFICANT CHANGE UP (ref 0–2)
EOSINOPHIL # BLD AUTO: 0.04 K/UL — SIGNIFICANT CHANGE UP (ref 0–0.5)
EOSINOPHIL NFR BLD AUTO: 0.4 % — SIGNIFICANT CHANGE UP (ref 0–6)
HCT VFR BLD CALC: 27.3 % — LOW (ref 34.5–45)
HGB BLD-MCNC: 8.8 G/DL — LOW (ref 11.5–15.5)
IMM GRANULOCYTES NFR BLD AUTO: 0.6 % — SIGNIFICANT CHANGE UP (ref 0–1.5)
LYMPHOCYTES # BLD AUTO: 1.67 K/UL — SIGNIFICANT CHANGE UP (ref 1–3.3)
LYMPHOCYTES # BLD AUTO: 17.3 % — SIGNIFICANT CHANGE UP (ref 13–44)
MCHC RBC-ENTMCNC: 28.1 PG — SIGNIFICANT CHANGE UP (ref 27–34)
MCHC RBC-ENTMCNC: 32.2 % — SIGNIFICANT CHANGE UP (ref 32–36)
MCV RBC AUTO: 87.2 FL — SIGNIFICANT CHANGE UP (ref 80–100)
MONOCYTES # BLD AUTO: 0.88 K/UL — SIGNIFICANT CHANGE UP (ref 0–0.9)
MONOCYTES NFR BLD AUTO: 9.1 % — SIGNIFICANT CHANGE UP (ref 2–14)
NEUTROPHILS # BLD AUTO: 6.99 K/UL — SIGNIFICANT CHANGE UP (ref 1.8–7.4)
NEUTROPHILS NFR BLD AUTO: 72.3 % — SIGNIFICANT CHANGE UP (ref 43–77)
NRBC # FLD: 0 K/UL — SIGNIFICANT CHANGE UP (ref 0–0)
PLATELET # BLD AUTO: 142 K/UL — LOW (ref 150–400)
PMV BLD: 11.9 FL — SIGNIFICANT CHANGE UP (ref 7–13)
RBC # BLD: 3.13 M/UL — LOW (ref 3.8–5.2)
RBC # FLD: 13 % — SIGNIFICANT CHANGE UP (ref 10.3–14.5)
WBC # BLD: 9.67 K/UL — SIGNIFICANT CHANGE UP (ref 3.8–10.5)
WBC # FLD AUTO: 9.67 K/UL — SIGNIFICANT CHANGE UP (ref 3.8–10.5)

## 2020-04-17 RX ORDER — OXYCODONE HYDROCHLORIDE 5 MG/1
5 TABLET ORAL
Refills: 0 | Status: DISCONTINUED | OUTPATIENT
Start: 2020-04-17 | End: 2020-04-18

## 2020-04-17 RX ORDER — IBUPROFEN 200 MG
600 TABLET ORAL EVERY 6 HOURS
Refills: 0 | Status: DISCONTINUED | OUTPATIENT
Start: 2020-04-17 | End: 2020-04-18

## 2020-04-17 RX ORDER — ACETAMINOPHEN 500 MG
3 TABLET ORAL
Qty: 0 | Refills: 0 | DISCHARGE
Start: 2020-04-17

## 2020-04-17 RX ORDER — IBUPROFEN 200 MG
1 TABLET ORAL
Qty: 0 | Refills: 0 | DISCHARGE
Start: 2020-04-17

## 2020-04-17 RX ADMIN — HEPARIN SODIUM 5000 UNIT(S): 5000 INJECTION INTRAVENOUS; SUBCUTANEOUS at 06:20

## 2020-04-17 RX ADMIN — Medication 975 MILLIGRAM(S): at 12:33

## 2020-04-17 RX ADMIN — Medication 975 MILLIGRAM(S): at 23:34

## 2020-04-17 RX ADMIN — Medication 975 MILLIGRAM(S): at 06:46

## 2020-04-17 RX ADMIN — Medication 600 MILLIGRAM(S): at 01:29

## 2020-04-17 RX ADMIN — Medication 600 MILLIGRAM(S): at 14:13

## 2020-04-17 RX ADMIN — Medication 600 MILLIGRAM(S): at 08:09

## 2020-04-17 RX ADMIN — Medication 975 MILLIGRAM(S): at 18:02

## 2020-04-17 NOTE — PROGRESS NOTE ADULT - SUBJECTIVE AND OBJECTIVE BOX
Postpartum Note,  Section  She is a  24y woman who is now post-operative day 2    Subjective:  The patient feels well.  She is ambulating.   She is tolerating regular diet.  She denies nausea and vomiting.  She is voiding.  Her pain is controlled.  She reports normal postpartum bleeding.  She is breastfeeding.  She is formula feeding.    Physical exam:    Vital Signs Last 24 Hrs  T(C): 36.6 (2020 06:24), Max: 37 (2020 01:55)  T(F): 97.9 (2020 06:24), Max: 98.6 (2020 01:55)  HR: 66 (2020 06:24) (66 - 102)  BP: 113/70 (2020 06:24) (98/53 - 113/70)  BP(mean): --  RR: 17 (2020 06:24) (17 - 20)  SpO2: 99% (2020 06:24) (98% - 100%)    Gen: NAD  Breast: Soft, nontender, not engorged.  Abdomen: Soft, nontender, no distension , firm uterine fundus at umbilicus.  Incision: Clean, dry, and intact with steri strips  Pelvic: Normal lochia noted  Ext: No calf tenderness    LABS:                        8.8    9.67  )-----------( 142      ( 2020 06:36 )             27.3       Rubella status:     Allergies    No Known Allergies    Intolerances      MEDICATIONS  (STANDING):  acetaminophen   Tablet .. 975 milliGRAM(s) Oral <User Schedule>  diphtheria/tetanus/pertussis (acellular) Vaccine (ADAcel) 0.5 milliLiter(s) IntraMuscular once  ferrous    sulfate 325 milliGRAM(s) Oral daily  heparin  Injectable 5000 Unit(s) SubCutaneous every 12 hours  ibuprofen  Tablet. 600 milliGRAM(s) Oral every 6 hours  prenatal multivitamin 1 Tablet(s) Oral daily    MEDICATIONS  (PRN):  diphenhydrAMINE 25 milliGRAM(s) Oral every 6 hours PRN Itching  glycerin Suppository - Adult 1 Suppository(s) Rectal at bedtime PRN Constipation  lanolin Ointment 1 Application(s) Topical every 6 hours PRN Sore Nipples  magnesium hydroxide Suspension 30 milliLiter(s) Oral two times a day PRN Constipation  oxyCODONE    IR 5 milliGRAM(s) Oral once PRN Moderate to Severe Pain (4-10)  oxyCODONE    IR 5 milliGRAM(s) Oral every 3 hours PRN Moderate to Severe Pain (4-10)  senna 1 Tablet(s) Oral two times a day PRN Constipation  simethicone 80 milliGRAM(s) Chew every 4 hours PRN Gas        Assessment and Plan  POD #2 s/p  section  Doing well.  Encourage ambulation.  Discharge planning

## 2020-04-17 NOTE — PROVIDER CONTACT NOTE (OTHER) - ACTION/TREATMENT ORDERED:
500 Bolus LR stat, cbc sent, keep snyder in until further notice. continue to monitor.
cbc ordered for am

## 2020-04-18 VITALS
SYSTOLIC BLOOD PRESSURE: 115 MMHG | DIASTOLIC BLOOD PRESSURE: 66 MMHG | HEART RATE: 64 BPM | OXYGEN SATURATION: 100 % | RESPIRATION RATE: 16 BRPM | TEMPERATURE: 98 F

## 2020-04-18 DIAGNOSIS — Z98.891 HISTORY OF UTERINE SCAR FROM PREVIOUS SURGERY: ICD-10-CM

## 2020-04-18 RX ADMIN — Medication 600 MILLIGRAM(S): at 06:18

## 2020-04-18 RX ADMIN — Medication 600 MILLIGRAM(S): at 12:48

## 2020-04-18 NOTE — PROGRESS NOTE ADULT - SUBJECTIVE AND OBJECTIVE BOX
NP provider contact note:  Patient seen at bedside resting comfortably offers no new complaints. + Ambulation, + void without difficulty, + flatus;  no bm;  tolerating regular diet. both breastfeeding and bottle feeding. Bonding well w/ .  Denies HA, blurry vision or epigastric pain, CP, SOB, N/V/D,  dizziness, palpitations, worsening vaginal bleeding.     Vital Signs Last 24 Hrs  T(C): 36.4 (2020 06:31), Max: 36.9 (2020 14:23)  T(F): 97.6 (2020 06:31), Max: 98.4 (2020 14:23)  HR: 64 (2020 06:31) (64 - 85)  BP: 115/66 (2020 06:31) (109/59 - 115/66)  BP(mean): --  RR: 16 (2020 06:31) (16 - 16)  SpO2: 100% (2020 06:31) (99% - 100%)                            8.8    9.67  )-----------( 142      ( 2020 06:36 )             27.3       A/P: POD #3 s/p c/s 4/15  -d/c home per MD note/discharge order  -instructions verbalized  -follow up in 1-2weeks in office for incision check    SILAS Martinez-LINDA  [phone]

## 2020-04-18 NOTE — PROGRESS NOTE ADULT - ASSESSMENT
Gen: A&O x 3, NAD  Chest: CTA B/L  Cardiac: S1,S2  RRR  Breast: Soft, nontender, nonengorged  Abdomen: +BS; soft; Nontender, nondistended, Incision C/D/I steri strips in place   Gyn: Minimal lochia  Extremities: Nontender, DTRS 2+, no worsening edema

## 2020-04-20 ENCOUNTER — APPOINTMENT (OUTPATIENT)
Dept: OBGYN | Facility: CLINIC | Age: 25
End: 2020-04-20

## 2020-04-26 ENCOUNTER — EMERGENCY (EMERGENCY)
Facility: HOSPITAL | Age: 25
LOS: 1 days | Discharge: HOME | End: 2020-04-26
Attending: STUDENT IN AN ORGANIZED HEALTH CARE EDUCATION/TRAINING PROGRAM
Payer: MEDICAID

## 2020-04-26 VITALS — HEART RATE: 101 BPM | TEMPERATURE: 100 F

## 2020-04-26 VITALS
TEMPERATURE: 100 F | RESPIRATION RATE: 16 BRPM | SYSTOLIC BLOOD PRESSURE: 117 MMHG | OXYGEN SATURATION: 99 % | HEART RATE: 112 BPM | DIASTOLIC BLOOD PRESSURE: 56 MMHG

## 2020-04-26 DIAGNOSIS — N61.0 MASTITIS WITHOUT ABSCESS: ICD-10-CM

## 2020-04-26 DIAGNOSIS — Z98.891 HISTORY OF UTERINE SCAR FROM PREVIOUS SURGERY: Chronic | ICD-10-CM

## 2020-04-26 LAB
ALBUMIN SERPL ELPH-MCNC: 3.9 G/DL — SIGNIFICANT CHANGE UP (ref 3.3–5)
ALP SERPL-CCNC: 87 U/L — SIGNIFICANT CHANGE UP (ref 40–120)
ALT FLD-CCNC: 23 U/L — SIGNIFICANT CHANGE UP (ref 4–33)
ANION GAP SERPL CALC-SCNC: 18 MMO/L — HIGH (ref 7–14)
APPEARANCE UR: CLEAR — SIGNIFICANT CHANGE UP
AST SERPL-CCNC: 23 U/L — SIGNIFICANT CHANGE UP (ref 4–32)
BACTERIA # UR AUTO: NEGATIVE — SIGNIFICANT CHANGE UP
BASE EXCESS BLDV CALC-SCNC: -0.6 MMOL/L — SIGNIFICANT CHANGE UP
BASOPHILS # BLD AUTO: 0.04 K/UL — SIGNIFICANT CHANGE UP (ref 0–0.2)
BASOPHILS NFR BLD AUTO: 0.2 % — SIGNIFICANT CHANGE UP (ref 0–2)
BILIRUB SERPL-MCNC: 0.4 MG/DL — SIGNIFICANT CHANGE UP (ref 0.2–1.2)
BILIRUB UR-MCNC: NEGATIVE — SIGNIFICANT CHANGE UP
BLOOD GAS VENOUS - CREATININE: SIGNIFICANT CHANGE UP MG/DL (ref 0.5–1.3)
BLOOD UR QL VISUAL: HIGH
BUN SERPL-MCNC: 9 MG/DL — SIGNIFICANT CHANGE UP (ref 7–23)
CALCIUM SERPL-MCNC: 9.6 MG/DL — SIGNIFICANT CHANGE UP (ref 8.4–10.5)
CHLORIDE BLDV-SCNC: 103 MMOL/L — SIGNIFICANT CHANGE UP (ref 96–108)
CHLORIDE SERPL-SCNC: 99 MMOL/L — SIGNIFICANT CHANGE UP (ref 98–107)
CO2 SERPL-SCNC: 20 MMOL/L — LOW (ref 22–31)
COLOR SPEC: YELLOW — SIGNIFICANT CHANGE UP
CREAT SERPL-MCNC: 0.57 MG/DL — SIGNIFICANT CHANGE UP (ref 0.5–1.3)
EOSINOPHIL # BLD AUTO: 0 K/UL — SIGNIFICANT CHANGE UP (ref 0–0.5)
EOSINOPHIL NFR BLD AUTO: 0 % — SIGNIFICANT CHANGE UP (ref 0–6)
GAS PNL BLDV: 135 MMOL/L — LOW (ref 136–146)
GLUCOSE BLDV-MCNC: 96 MG/DL — SIGNIFICANT CHANGE UP (ref 70–99)
GLUCOSE SERPL-MCNC: 95 MG/DL — SIGNIFICANT CHANGE UP (ref 70–99)
GLUCOSE UR-MCNC: NEGATIVE — SIGNIFICANT CHANGE UP
HCO3 BLDV-SCNC: 23 MMOL/L — SIGNIFICANT CHANGE UP (ref 20–27)
HCT VFR BLD CALC: 33.3 % — LOW (ref 34.5–45)
HCT VFR BLDV CALC: 35 % — SIGNIFICANT CHANGE UP (ref 34.5–45)
HGB BLD-MCNC: 10.8 G/DL — LOW (ref 11.5–15.5)
HGB BLDV-MCNC: 11.4 G/DL — LOW (ref 11.5–15.5)
HYALINE CASTS # UR AUTO: HIGH
IMM GRANULOCYTES NFR BLD AUTO: 0.5 % — SIGNIFICANT CHANGE UP (ref 0–1.5)
KETONES UR-MCNC: HIGH
LACTATE BLDV-MCNC: 1.9 MMOL/L — SIGNIFICANT CHANGE UP (ref 0.5–2)
LEUKOCYTE ESTERASE UR-ACNC: SIGNIFICANT CHANGE UP
LYMPHOCYTES # BLD AUTO: 0.68 K/UL — LOW (ref 1–3.3)
LYMPHOCYTES # BLD AUTO: 4 % — LOW (ref 13–44)
MCHC RBC-ENTMCNC: 28.2 PG — SIGNIFICANT CHANGE UP (ref 27–34)
MCHC RBC-ENTMCNC: 32.4 % — SIGNIFICANT CHANGE UP (ref 32–36)
MCV RBC AUTO: 86.9 FL — SIGNIFICANT CHANGE UP (ref 80–100)
MONOCYTES # BLD AUTO: 0.43 K/UL — SIGNIFICANT CHANGE UP (ref 0–0.9)
MONOCYTES NFR BLD AUTO: 2.5 % — SIGNIFICANT CHANGE UP (ref 2–14)
NEUTROPHILS # BLD AUTO: 15.68 K/UL — HIGH (ref 1.8–7.4)
NEUTROPHILS NFR BLD AUTO: 92.8 % — HIGH (ref 43–77)
NITRITE UR-MCNC: NEGATIVE — SIGNIFICANT CHANGE UP
NRBC # FLD: 0 K/UL — SIGNIFICANT CHANGE UP (ref 0–0)
PCO2 BLDV: 42 MMHG — SIGNIFICANT CHANGE UP (ref 41–51)
PH BLDV: 7.37 PH — SIGNIFICANT CHANGE UP (ref 7.32–7.43)
PH UR: 6.5 — SIGNIFICANT CHANGE UP (ref 5–8)
PLATELET # BLD AUTO: 222 K/UL — SIGNIFICANT CHANGE UP (ref 150–400)
PMV BLD: 11.3 FL — SIGNIFICANT CHANGE UP (ref 7–13)
PO2 BLDV: 30 MMHG — LOW (ref 35–40)
POTASSIUM BLDV-SCNC: 3.2 MMOL/L — LOW (ref 3.4–4.5)
POTASSIUM SERPL-MCNC: 3 MMOL/L — LOW (ref 3.5–5.3)
POTASSIUM SERPL-SCNC: 3 MMOL/L — LOW (ref 3.5–5.3)
PROT SERPL-MCNC: 7.5 G/DL — SIGNIFICANT CHANGE UP (ref 6–8.3)
PROT UR-MCNC: 20 — SIGNIFICANT CHANGE UP
RBC # BLD: 3.83 M/UL — SIGNIFICANT CHANGE UP (ref 3.8–5.2)
RBC # FLD: 12.6 % — SIGNIFICANT CHANGE UP (ref 10.3–14.5)
RBC CASTS # UR COMP ASSIST: >50 — HIGH (ref 0–?)
SAO2 % BLDV: 45.3 % — LOW (ref 60–85)
SODIUM SERPL-SCNC: 137 MMOL/L — SIGNIFICANT CHANGE UP (ref 135–145)
SP GR SPEC: 1.03 — SIGNIFICANT CHANGE UP (ref 1–1.04)
SQUAMOUS # UR AUTO: SIGNIFICANT CHANGE UP
UROBILINOGEN FLD QL: SIGNIFICANT CHANGE UP
WBC # BLD: 16.92 K/UL — HIGH (ref 3.8–10.5)
WBC # FLD AUTO: 16.92 K/UL — HIGH (ref 3.8–10.5)
WBC UR QL: HIGH (ref 0–?)

## 2020-04-26 PROCEDURE — 71045 X-RAY EXAM CHEST 1 VIEW: CPT | Mod: 26

## 2020-04-26 PROCEDURE — 99283 EMERGENCY DEPT VISIT LOW MDM: CPT

## 2020-04-26 RX ORDER — DICLOXACILLIN SODIUM 500 MG/1
1 CAPSULE ORAL
Qty: 56 | Refills: 0
Start: 2020-04-26 | End: 2020-05-09

## 2020-04-26 RX ORDER — SODIUM CHLORIDE 9 MG/ML
1000 INJECTION INTRAMUSCULAR; INTRAVENOUS; SUBCUTANEOUS ONCE
Refills: 0 | Status: COMPLETED | OUTPATIENT
Start: 2020-04-26 | End: 2020-04-26

## 2020-04-26 RX ORDER — CEPHALEXIN 500 MG
500 CAPSULE ORAL ONCE
Refills: 0 | Status: COMPLETED | OUTPATIENT
Start: 2020-04-26 | End: 2020-04-26

## 2020-04-26 RX ORDER — POTASSIUM CHLORIDE 20 MEQ
40 PACKET (EA) ORAL ONCE
Refills: 0 | Status: COMPLETED | OUTPATIENT
Start: 2020-04-26 | End: 2020-04-26

## 2020-04-26 RX ORDER — IBUPROFEN 200 MG
600 TABLET ORAL ONCE
Refills: 0 | Status: COMPLETED | OUTPATIENT
Start: 2020-04-26 | End: 2020-04-26

## 2020-04-26 RX ORDER — ACETAMINOPHEN 500 MG
650 TABLET ORAL ONCE
Refills: 0 | Status: COMPLETED | OUTPATIENT
Start: 2020-04-26 | End: 2020-04-26

## 2020-04-26 RX ADMIN — Medication 40 MILLIEQUIVALENT(S): at 16:00

## 2020-04-26 RX ADMIN — SODIUM CHLORIDE 1000 MILLILITER(S): 9 INJECTION INTRAMUSCULAR; INTRAVENOUS; SUBCUTANEOUS at 13:27

## 2020-04-26 RX ADMIN — SODIUM CHLORIDE 1000 MILLILITER(S): 9 INJECTION INTRAMUSCULAR; INTRAVENOUS; SUBCUTANEOUS at 12:00

## 2020-04-26 RX ADMIN — Medication 500 MILLIGRAM(S): at 15:19

## 2020-04-26 RX ADMIN — Medication 650 MILLIGRAM(S): at 15:30

## 2020-04-26 RX ADMIN — Medication 600 MILLIGRAM(S): at 14:23

## 2020-04-26 NOTE — CONSULT NOTE ADULT - SUBJECTIVE AND OBJECTIVE BOX
HPI    Patient is a 24y  POD#11 s/p rLTCS who presents with fevers and chills x1day. Patient noted low grade fevers starting early last night, which then peaked at 3a 102.5. Patient also noted lightheadedness and dizziness this am prompting her to present to urgent care for further evaluation. Upon presentation noted to be tachycardic to the 130s w/Tm 102.5 and subsequently sent to ED for further evaluation and management. Patient denies any associated abdominal pain, N/V, CP, SOB, URI symptoms, sick contacts, or dysuria. ROS positive for incisional itching since time of discharge, and few episodes of bleeding from incision (reports noticing a few dots of blood in the L lateral portion on occasion). Patient is currently pumping and has not noticed any particularly red or tender areas on either breast. She is tolerating PO, ambulating without difficulty, passing flatus, and voiding spontaneously.    OB/GYN Provider: Dr. Sullivan    OBHx: 2019 pLTCS for arrest,  rLTCS (short interval between pregnancies)  GYNHx: Dneies  PMH: Denies  PSH: C/S x2  Rx: PNV, Tylenol, Zinc  All: NKDA  Psych Hx: Denies  Social Hx: Denies x3  ROS Negative unless otherwise noted in HPI    Vital Signs Last 24 Hrs  T(C): 37.7 (2020 10:58), Max: 37.7 (2020 10:58)  T(F): 99.9 (2020 10:58), Max: 99.9 (2020 10:58)  HR: 112 (2020 10:58) (112 - 112)  BP: 117/56 (2020 10:58) (117/56 - 117/56)  BP(mean): --  RR: 16 (2020 10:58) (16 - 16)  SpO2: 99% (2020 10:58) (99% - 99%)    PHYSICAL EXAM:  Constitutional: alert and oriented x 3  Breasts: Dense bilaterally, NTTP bilaterally, ~3cm area of erythema in R inner upper quadrant overlying area of increasingly dense breast tissue, small ~1-2cm area of erythema in L inner upper quadrant  Respiratory: clear  Cardiovascular: regular rate and rhythm  Gastrointestinal: soft, non tender, + bowel sounds.   Incision: C/D/I with steri strips in place, no erythema or edema noted  Genitourinary: Internal exam deferred. Lochia wnl.    LABS:                        10.8   16.92 )-----------( 222      ( 2020 12:00 )             33.3         137  |  99  |  9   ----------------------------<  95  3.0<L>   |  20<L>  |  0.57    Ca    9.6      2020 12:00    TPro  7.5  /  Alb  3.9  /  TBili  0.4  /  DBili  x   /  AST  23  /  ALT  23  /  AlkPhos  87        Urinalysis Basic - ( 2020 12:00 )    Color: YELLOW / Appearance: CLEAR / S.030 / pH: 6.5  Gluc: NEGATIVE / Ketone: LARGE  / Bili: NEGATIVE / Urobili: TRACE   Blood: MODERATE / Protein: 20 / Nitrite: NEGATIVE   Leuk Esterase: SMALL / RBC: >50 / WBC 11-25   Sq Epi: OCC / Non Sq Epi: x / Bacteria: NEGATIVE    COVID negative (4/15)    Blood Type: O Positive      RADIOLOGY & ADDITIONAL STUDIES:  CXR (4/15) Clear lungs

## 2020-04-26 NOTE — ED PROVIDER NOTE - NSFOLLOWUPINSTRUCTIONS_ED_ALL_ED_FT
Follow up with your OBGYN within 1 week  CALL YOUR OBGYN in 24 hours if symptoms are not improving  Take Dicloxacillin 500mg (1 tablet) 4 times a day for 14 days  Take Tylenol 650mg every 6 hours as needed for fever  Take Motrin 600mg every 6 hours as needed Follow up with your OBGYN within 1 week  CALL YOUR OBGYN in 24 hours if symptoms are not improving  Take Dicloxacillin 500mg (1 tablet) 4 times a day for 14 days  Take Tylenol 650mg every 6 hours as needed for fever  Take Motrin 600mg every 6 hours as needed for pain, take with food  Apply warm compress to area  Return to the ER with any worsening or concerning symptoms, increased pain or swelling or breasts, fever, weakness, abdominal pain or any other concerns.

## 2020-04-26 NOTE — ED ADULT TRIAGE NOTE - CHIEF COMPLAINT QUOTE
Sent from urgent care for fever and malaise since last night. States she had  10 days ago. Had fever 102.5 this AM. Given 500mg Tylenol at 10am. Tested Negative for covid 19 before giving birth. Swabbed again today in office. Denies incisional pain, bleeding, cough, sob, n/v, rash.

## 2020-04-26 NOTE — ED PROVIDER NOTE - CLINICAL SUMMARY MEDICAL DECISION MAKING FREE TEXT BOX
24F s/p  10 days ago p/w fever since last night.  patient has had some discomfort at incision site, but no redness or drainage.  denies URI symptoms, dysuria.  Went to urgent care who sent patient in to ER.  Pt had negative covid swab at time to delivery and was swabbed in urgent care again today.  Pt A&O x 3, NAD, heart mildly tachycardic, lungs cta, abd soft ntnd with well healing incision, left breast with mild medial erythema.  with lack of abd tenderness on exam, unlikely surgical site infection or endometritis, possible mastitis, uti, ?covid.  will check labs, ua, cxr and reassess.  - Madelyn Hamm, DO

## 2020-04-26 NOTE — ED PROVIDER NOTE - OBJECTIVE STATEMENT
24yF s/p  on 4/15 sent to ED from  with fever and malaise. Pt states she was discharged home on  and had been feeling well. Last night she developed fever, tmax 100.5, with associated chills and headache. Pt took tylenol throughout the night but continued to have headache and fever. She went to  this morning and was instructed to come to the ER. Pt reports "soreness" at incision site, denies abdominal pain. Pt denies nausea, vomiting, diarrhea, dysuria, urinary frequency, chest pain, shortness of breath, palpitations, near syncope, neck pain, rash or any other concern.  Of note pt states she has a history of mastitis. 24yF s/p  on 4/15 sent to ED from  with fever and malaise. Pt states she was discharged home on  and had been feeling well. Last night she developed fever, tmax 100.5, with associated chills and headache. Pt took tylenol throughout the night but continued to have headache and fever. She went to  this morning and was instructed to come to the ER. Pt reports "soreness" at incision site, denies abdominal pain. Pt denies nausea, vomiting, diarrhea, dysuria, urinary frequency, chest pain, shortness of breath, palpitations, near syncope, neck pain, rash or any other concern.  Of note pt states she has a history of mastitis. Pt received tylenol at urgent care prior to arrival

## 2020-04-26 NOTE — ED PROVIDER NOTE - PATIENT PORTAL LINK FT
You can access the FollowMyHealth Patient Portal offered by [facility] by registering at the following website: [email or web address]. By joining [facility]’s FollowMyHealth portal, you will also be able to view your health information using other applications (apps) compatible with our system.

## 2020-04-26 NOTE — CONSULT NOTE ADULT - ASSESSMENT
Patient is a 24y  POD#11 s/p rLTCS who presents with fevers and chills x1day. Physical exam significant for high grade fevers and tachycardia in the setting of breast erythema suspicious for mastitis. Given lack of other focal signs or symptoms, likely not related to incision or recent pregnancy (i.e. endometritis, cellulitis, etc.).

## 2020-04-26 NOTE — ED PROVIDER NOTE - PROGRESS NOTE DETAILS
LAI Goldman: Pt seen by OBGYN, likely mastitis, rpt vitals show tachycardia and temp 103, IBU ordered, will reassess. GYN recommending Dicloxacillin for treatment LAI Goldman: Pt is feeling improved will d/c home with instructions to call her OB if she does not feel improved in 24 hours

## 2020-04-26 NOTE — CONSULT NOTE ADULT - PROBLEM SELECTOR RECOMMENDATION 9
-Patient mentating well and HR improving with IVF. Now afebrile. Recommend discharge home on outpatient antibiotic therapy, dicloxacillin 500mg 4x/d for 10-14days. If no clinical improvement within 24h, to call OB/GYN office.   -Tylenol PRN for fever >100.4.  -NSAIDS and/or Tylenol PRN for pain.  -Increase pumping frequency to q2h in addition to manual expression and application of warm compresses over affected areas.    Marybeth Cooper, PGY2  D/W Dr. Stallworth

## 2020-04-26 NOTE — ED PROVIDER NOTE - PHYSICAL EXAMINATION
well healing incision site across lower abdomen, no drainage or surrounding erythema well healing incision site across lower abdomen, no drainage or surrounding erythema  b/l breasts with erythema to 12:00 position, R>L, no area of fluctuance, no nipple discharge

## 2020-04-27 ENCOUNTER — INPATIENT (INPATIENT)
Facility: HOSPITAL | Age: 25
LOS: 1 days | Discharge: HOME | End: 2020-04-29
Attending: OBSTETRICS & GYNECOLOGY | Admitting: OBSTETRICS & GYNECOLOGY
Payer: MEDICAID

## 2020-04-27 VITALS
HEIGHT: 62 IN | DIASTOLIC BLOOD PRESSURE: 49 MMHG | RESPIRATION RATE: 19 BRPM | HEART RATE: 134 BPM | SYSTOLIC BLOOD PRESSURE: 121 MMHG | TEMPERATURE: 100 F | OXYGEN SATURATION: 100 %

## 2020-04-27 DIAGNOSIS — R50.9 FEVER, UNSPECIFIED: ICD-10-CM

## 2020-04-27 DIAGNOSIS — A41.9 SEPSIS, UNSPECIFIED ORGANISM: ICD-10-CM

## 2020-04-27 DIAGNOSIS — Z98.891 HISTORY OF UTERINE SCAR FROM PREVIOUS SURGERY: Chronic | ICD-10-CM

## 2020-04-27 LAB
ALBUMIN SERPL ELPH-MCNC: 3.5 G/DL — SIGNIFICANT CHANGE UP (ref 3.3–5)
ALP SERPL-CCNC: 86 U/L — SIGNIFICANT CHANGE UP (ref 40–120)
ALT FLD-CCNC: 52 U/L — HIGH (ref 4–33)
ANION GAP SERPL CALC-SCNC: 12 MMO/L — SIGNIFICANT CHANGE UP (ref 7–14)
APTT BLD: 35.3 SEC — SIGNIFICANT CHANGE UP (ref 27.5–36.3)
AST SERPL-CCNC: 51 U/L — HIGH (ref 4–32)
BASE EXCESS BLDV CALC-SCNC: -2.2 MMOL/L — SIGNIFICANT CHANGE UP
BASOPHILS # BLD AUTO: 0.05 K/UL — SIGNIFICANT CHANGE UP (ref 0–0.2)
BASOPHILS NFR BLD AUTO: 0.3 % — SIGNIFICANT CHANGE UP (ref 0–2)
BILIRUB SERPL-MCNC: 0.3 MG/DL — SIGNIFICANT CHANGE UP (ref 0.2–1.2)
BLD GP AB SCN SERPL QL: NEGATIVE — SIGNIFICANT CHANGE UP
BLOOD GAS VENOUS - CREATININE: 0.68 MG/DL — SIGNIFICANT CHANGE UP (ref 0.5–1.3)
BUN SERPL-MCNC: 10 MG/DL — SIGNIFICANT CHANGE UP (ref 7–23)
CALCIUM SERPL-MCNC: 9.4 MG/DL — SIGNIFICANT CHANGE UP (ref 8.4–10.5)
CHLORIDE BLDV-SCNC: 106 MMOL/L — SIGNIFICANT CHANGE UP (ref 96–108)
CHLORIDE SERPL-SCNC: 101 MMOL/L — SIGNIFICANT CHANGE UP (ref 98–107)
CO2 SERPL-SCNC: 21 MMOL/L — LOW (ref 22–31)
CREAT SERPL-MCNC: 0.63 MG/DL — SIGNIFICANT CHANGE UP (ref 0.5–1.3)
CULTURE RESULTS: SIGNIFICANT CHANGE UP
EOSINOPHIL # BLD AUTO: 0.01 K/UL — SIGNIFICANT CHANGE UP (ref 0–0.5)
EOSINOPHIL NFR BLD AUTO: 0.1 % — SIGNIFICANT CHANGE UP (ref 0–6)
GAS PNL BLDV: 133 MMOL/L — LOW (ref 136–146)
GLUCOSE BLDV-MCNC: 131 MG/DL — HIGH (ref 70–99)
GLUCOSE SERPL-MCNC: 135 MG/DL — HIGH (ref 70–99)
HCO3 BLDV-SCNC: 22 MMOL/L — SIGNIFICANT CHANGE UP (ref 20–27)
HCT VFR BLD CALC: 34.3 % — LOW (ref 34.5–45)
HCT VFR BLDV CALC: 34.9 % — SIGNIFICANT CHANGE UP (ref 34.5–45)
HGB BLD-MCNC: 11 G/DL — LOW (ref 11.5–15.5)
HGB BLDV-MCNC: 11.3 G/DL — LOW (ref 11.5–15.5)
IMM GRANULOCYTES NFR BLD AUTO: 1.3 % — SIGNIFICANT CHANGE UP (ref 0–1.5)
INR BLD: 1.32 — HIGH (ref 0.88–1.17)
LACTATE BLDV-MCNC: 1.4 MMOL/L — SIGNIFICANT CHANGE UP (ref 0.5–2)
LYMPHOCYTES # BLD AUTO: 0.85 K/UL — LOW (ref 1–3.3)
LYMPHOCYTES # BLD AUTO: 5.4 % — LOW (ref 13–44)
MCHC RBC-ENTMCNC: 27.3 PG — SIGNIFICANT CHANGE UP (ref 27–34)
MCHC RBC-ENTMCNC: 32.1 % — SIGNIFICANT CHANGE UP (ref 32–36)
MCV RBC AUTO: 85.1 FL — SIGNIFICANT CHANGE UP (ref 80–100)
MONOCYTES # BLD AUTO: 0.25 K/UL — SIGNIFICANT CHANGE UP (ref 0–0.9)
MONOCYTES NFR BLD AUTO: 1.6 % — LOW (ref 2–14)
NEUTROPHILS # BLD AUTO: 14.29 K/UL — HIGH (ref 1.8–7.4)
NEUTROPHILS NFR BLD AUTO: 91.3 % — HIGH (ref 43–77)
NRBC # FLD: 0 K/UL — SIGNIFICANT CHANGE UP (ref 0–0)
PCO2 BLDV: 41 MMHG — SIGNIFICANT CHANGE UP (ref 41–51)
PH BLDV: 7.36 PH — SIGNIFICANT CHANGE UP (ref 7.32–7.43)
PLATELET # BLD AUTO: 192 K/UL — SIGNIFICANT CHANGE UP (ref 150–400)
PMV BLD: 11.1 FL — SIGNIFICANT CHANGE UP (ref 7–13)
PO2 BLDV: 54 MMHG — HIGH (ref 35–40)
POTASSIUM BLDV-SCNC: 3.2 MMOL/L — LOW (ref 3.4–4.5)
POTASSIUM SERPL-MCNC: 3.5 MMOL/L — SIGNIFICANT CHANGE UP (ref 3.5–5.3)
POTASSIUM SERPL-SCNC: 3.5 MMOL/L — SIGNIFICANT CHANGE UP (ref 3.5–5.3)
PROT SERPL-MCNC: 7 G/DL — SIGNIFICANT CHANGE UP (ref 6–8.3)
PROTHROM AB SERPL-ACNC: 15.2 SEC — HIGH (ref 9.8–13.1)
RBC # BLD: 4.03 M/UL — SIGNIFICANT CHANGE UP (ref 3.8–5.2)
RBC # FLD: 12.8 % — SIGNIFICANT CHANGE UP (ref 10.3–14.5)
RH IG SCN BLD-IMP: POSITIVE — SIGNIFICANT CHANGE UP
SAO2 % BLDV: 82.7 % — SIGNIFICANT CHANGE UP (ref 60–85)
SARS-COV-2 RNA SPEC QL NAA+PROBE: SIGNIFICANT CHANGE UP
SODIUM SERPL-SCNC: 134 MMOL/L — LOW (ref 135–145)
SPECIMEN SOURCE: SIGNIFICANT CHANGE UP
WBC # BLD: 15.66 K/UL — HIGH (ref 3.8–10.5)
WBC # FLD AUTO: 15.66 K/UL — HIGH (ref 3.8–10.5)

## 2020-04-27 PROCEDURE — 74177 CT ABD & PELVIS W/CONTRAST: CPT | Mod: 26

## 2020-04-27 PROCEDURE — 59510 CESAREAN DELIVERY: CPT | Mod: U9

## 2020-04-27 PROCEDURE — 76856 US EXAM PELVIC COMPLETE: CPT | Mod: 26

## 2020-04-27 RX ORDER — SODIUM CHLORIDE 9 MG/ML
1000 INJECTION, SOLUTION INTRAVENOUS
Refills: 0 | Status: DISCONTINUED | OUTPATIENT
Start: 2020-04-27 | End: 2020-04-28

## 2020-04-27 RX ORDER — VANCOMYCIN HCL 1 G
1000 VIAL (EA) INTRAVENOUS ONCE
Refills: 0 | Status: COMPLETED | OUTPATIENT
Start: 2020-04-27 | End: 2020-04-27

## 2020-04-27 RX ORDER — PIPERACILLIN AND TAZOBACTAM 4; .5 G/20ML; G/20ML
3.38 INJECTION, POWDER, LYOPHILIZED, FOR SOLUTION INTRAVENOUS ONCE
Refills: 0 | Status: COMPLETED | OUTPATIENT
Start: 2020-04-27 | End: 2020-04-27

## 2020-04-27 RX ORDER — PIPERACILLIN AND TAZOBACTAM 4; .5 G/20ML; G/20ML
3.38 INJECTION, POWDER, LYOPHILIZED, FOR SOLUTION INTRAVENOUS EVERY 8 HOURS
Refills: 0 | Status: DISCONTINUED | OUTPATIENT
Start: 2020-04-28 | End: 2020-04-29

## 2020-04-27 RX ORDER — SODIUM CHLORIDE 9 MG/ML
1000 INJECTION INTRAMUSCULAR; INTRAVENOUS; SUBCUTANEOUS ONCE
Refills: 0 | Status: COMPLETED | OUTPATIENT
Start: 2020-04-27 | End: 2020-04-27

## 2020-04-27 RX ORDER — IBUPROFEN 200 MG
600 TABLET ORAL ONCE
Refills: 0 | Status: COMPLETED | OUTPATIENT
Start: 2020-04-27 | End: 2020-04-27

## 2020-04-27 RX ORDER — VANCOMYCIN HCL 1 G
1000 VIAL (EA) INTRAVENOUS EVERY 12 HOURS
Refills: 0 | Status: DISCONTINUED | OUTPATIENT
Start: 2020-04-28 | End: 2020-04-29

## 2020-04-27 RX ADMIN — SODIUM CHLORIDE 125 MILLILITER(S): 9 INJECTION, SOLUTION INTRAVENOUS at 23:11

## 2020-04-27 RX ADMIN — Medication 250 MILLIGRAM(S): at 18:11

## 2020-04-27 RX ADMIN — PIPERACILLIN AND TAZOBACTAM 200 GRAM(S): 4; .5 INJECTION, POWDER, LYOPHILIZED, FOR SOLUTION INTRAVENOUS at 16:41

## 2020-04-27 RX ADMIN — Medication 600 MILLIGRAM(S): at 16:40

## 2020-04-27 RX ADMIN — SODIUM CHLORIDE 1000 MILLILITER(S): 9 INJECTION INTRAMUSCULAR; INTRAVENOUS; SUBCUTANEOUS at 16:39

## 2020-04-27 RX ADMIN — Medication 1000 MILLIGRAM(S): at 21:36

## 2020-04-27 NOTE — H&P ADULT - ASSESSMENT
A/P: 24y  POD#12 s/p uncomplicated rLTCS for labor presenting with fever and tachycardia not responsive to tylenol and dicloxacillin which was prescribed yesterday for presumed mastitis. Patient tachycardic to 130s, temperature of 100.1, satting 100% on RA. Exam with mild erythema over bilateral breasts, no focal findings. Benign abdominal exam. Lungs clear. Unclear etiology of fevers/tachycardia postpartum. Differential continues to be broad--including mastitis and endometritis. Low clinical suspicion for endometritis, but given CT findings will continue to evaluate as well as empirically treat and monitor for symptoms and improvement.

## 2020-04-27 NOTE — H&P ADULT - NSHPREVIEWOFSYSTEMS_GEN_ALL_CORE
REVIEW OF SYSTEMS      General: Denies chills, weakness	    Skin/Breast: Denies breast pain  	  Respiratory and Thorax: Denies SOB  	  Cardiovascular: Denies chest pain or palpitations    Gastrointestinal: Denies abdominal pain. Denies nausea/vomiting. Denies diarrhea    Genitourinary: Denies dysuria, increased urinary frequency, urgency	    Constitutional, Cardiovascular, Respiratory, Gastrointestinal, Genitourinary, Musculoskeletal, Neurological, and Integumentary review of systems are normal except as noted

## 2020-04-27 NOTE — ED PROVIDER NOTE - ATTENDING CONTRIBUTION TO CARE
Attending Attestation: Dr. Presley  I have personally performed a history and physical examination of the patient and discussed management with the resident as well as the patient.  I reviewed the resident's note and agree with the documented findings and plan of care.  I have authored and modified critical sections of the Provider Note, including but not limited to HPI, Physical Exam and MDM. Adult female, recently post c sxn, p/with fever and cough. Delivered c section 12 days ago. No abd pain. No breast pain. No SOB or pleurisy.  Unclear source of fever from physical exam though would consider significant risk of COVID given exposures and symptoms. Plan: CTAP at request of Ob c/s, OBGYN c/s, repeat blood cultures, fever control, COVID test.

## 2020-04-27 NOTE — ED PROVIDER NOTE - SKIN, MLM
Skin normal color for race, warm, dry and intact. No evidence of rash. Breast exam c several areas of woody induration with minimal erythema and no tenderness.  No nipple dc or tenderness. Skin normal color for race, warm, dry and intact. No evidence of rash. Breast exam c several areas of woody induration with minimal erythema and no tenderness.  No nipple dc or tenderness. (Chaperon: ELKE Pike)

## 2020-04-27 NOTE — ED PROVIDER NOTE - OBJECTIVE STATEMENT
24y  POD#12 s/p uncomplicated rLTCS for labor presenting with fever. Patient seen in ED yesterday with fevers, tachycardia, dizziness. At that time the patient what thought to have mastitis and discharged with dicloxacillin. Patient called office today to reports fevers of 105 max while on PO antibiotics and Tylenol around the clock. Presenting to ED as instructed for additional work up. Patient reports fevers and chills. Reports worsening of dry cough this morning though reports cough was present at time of delivery. Denies chest pain, shortness of breath, abdominal pain, urinary symptoms, changes in bowel movements, purulent vaginal discharge. Endorses lochia consistent with her normal menses. Patient denies focal areas of redness/tenderness/fluctuance. Patient reports she had mastitis this pregnancy prior to delivery and this feels different. She is pumping regularly. She is concerned about possible COVID exposure as she walks through the hospital daily to visit her baby in NICU. 24y  POD#12 s/p uncomplicated rLTCS for labor presenting with fever. Patient seen in ED yesterday with fevers, tachycardia, dizziness. At that time the patient what thought to have mastitis and discharged with dicloxacillin. Patient called office today to reports fevers of 105 max while on PO antibiotics and Tylenol around the clock. Presenting to ED as instructed for additional work up. Patient reports fevers and chills. Reports worsening of dry cough this morning though reports cough was present at time of delivery. Denies chest pain, shortness of breath, abdominal pain, urinary symptoms, changes in bowel movements, purulent vaginal discharge. Endorses lochia which are more prominent than during prior c sxn. Patient denies focal areas of redness/tenderness/fluctuance. Patient reports she had mastitis this pregnancy prior to delivery and this feels different. She is pumping regularly. She is concerned about possible COVID exposure as she walks through the hospital daily to visit her baby in NICU. Has no pain, no SOB, no pleurisy. No chest pain. No urinary complaints.

## 2020-04-27 NOTE — H&P ADULT - NSHPLABSRESULTS_GEN_ALL_CORE
LABS:                     10.8   16.92 )-----------( 222      ( 2020 12:00 )             33.3     -  137  |  99  |  9   ----------------------------<  95  3.0<L>   |  20<L>  |  0.57    Ca    9.6      2020 12:00    TPro  7.5  /  Alb  3.9  /  TBili  0.4  /  DBili  x   /  AST  23  /  ALT  23  /  AlkPhos  87      I&O's Detail      Urinalysis Basic - ( 2020 12:00 )  Color: YELLOW / Appearance: CLEAR / S.030 / pH: 6.5  Gluc: NEGATIVE / Ketone: LARGE  / Bili: NEGATIVE / Urobili: TRACE   Blood: MODERATE / Protein: 20 / Nitrite: NEGATIVE   Leuk Esterase: SMALL / RBC: >50 / WBC 11-25   Sq Epi: OCC / Non Sq Epi: x / Bacteria: NEGATIVE    < from: CT Abdomen and Pelvis w/ IV Cont (20 @ 18:48) >      EXAM:  CT ABDOMEN AND PELVIS IC        PROCEDURE DATE:  2020     INTERPRETATION:  CLINICAL INFORMATION: Fever, status post , postoperative day 12    COMPARISON: None.    PROCEDURE:   CT of the Abdomen and Pelvis was performed with intravenous contrast.   Intravenous contrast: 90 ml Omnipaque 350. 10 ml discarded.  Oral contrast: None.  Sagittal and coronal reformats were performed.    FINDINGS:    LOWER CHEST: Within normal limits.    LIVER: Within normal limits.  BILE DUCTS: Normal caliber.  GALLBLADDER: Within normal limits.  SPLEEN: Within normal limits.  PANCREAS: Within normal limits.  ADRENALS: Within normal limits.  KIDNEYS/URETERS: Within normal limits.    BLADDER: Within normal limits.  REPRODUCTIVE ORGANS: Postpartum appearance of the uterus. Focus of enhancing soft tissue within the right cornual region of the endometrial canal. No regional fluid collections.    BOWEL: No bowel obstruction. Appendix is normal.  PERITONEUM: Trace pelvic free fluid.  VESSELS: Within normal limits.  RETROPERITONEUM/LYMPH NODES: No lymphadenopathy.    ABDOMINAL WALL: Subcutaneous soft tissue inflammatory changes in the lower ventral abdominal wall, likely representing postsurgical changes. Tiny fat-containing umbilical hernia.  BONES: Degenerative changes.    IMPRESSION:     Postpartum appearance of the uterus. Focus of enhancing tissue within the right cornual region of the endometrial canal, suspicious for retained products of conception. No regional fluid collections.    BENJI SANDOVAL M.D., RADIOLOGY RESIDENT  This document has been electronically signed.  KOFI FRENCH M.D., ATTENDING RADIOLOGIST  This document has been electronically signed. 2020  7:50PM      < end of copied text >

## 2020-04-27 NOTE — CONSULT NOTE ADULT - PROBLEM SELECTOR RECOMMENDATION 9
Recommendations:  - Sepsis labs, blood cultures, urine culture  - Chest XR, COVID-19 swab  - CT Abdomen/Pelvis w/ IV contrast  - CT Chest w/ IV contrast  - IV Vancomycin / Zoshannon Hurst PGY2  d/w Dr. Sullivan

## 2020-04-27 NOTE — H&P ADULT - HISTORY OF PRESENT ILLNESS
NATASHA STEARNS  24y  Female [id number]    HPI: 24y  POD#12 s/p uncomplicated rLTCS for labor presenting with fever. Patient seen in ED yesterday with fevers, tachycardia, dizziness. At that time the patient what thought to have mastitis and discharged with dicloxacillin. Patient called office today to reports fevers of 105 max while on PO antibiotics and Tylenol around the clock. Presenting to ED as instructed for additional work up. Patient reports fevers and chills. Reports worsening of dry cough this morning though reports cough was present at time of delivery. Denies chest pain, shortness of breath, abdominal pain, urinary symptoms, changes in bowel movements, purulent vaginal discharge. Endorses lochia consistent with her normal menses. Patient denies focal areas of redness/tenderness/fluctuance. Patient reports she had mastitis this pregnancy prior to delivery and this feels diffierent. She is pumping regularly. She is concerned about possible COVID exposure as she walks through the hospital daily to visit her baby in NICU.     OB/GYN Provider: Dr. Sullivan    OBHx: 2019 pLTCS for arrest,  rLTCS (short interval between pregnancies)  GYNHx: Dneies  PMH: Denies  PSH: C/S x2  Rx: PNV, Tylenol, Zinc  All: NKDA  Psych Hx: Denies  Social Hx: Denies x3

## 2020-04-27 NOTE — H&P ADULT - PROBLEM SELECTOR PLAN 1
- nasopharyngeal swab and ct-ap negative for covid-19--no further workup for URI at this time  - f/u blood cultures (4/27)  - c/w vanc for presumed mastitis  - c/w zosyn for presumed endometritis  - f/u pelvic sono to assess for retained products  - repeat CBC/CMP/Coags in AM  - No standing antipyretics

## 2020-04-27 NOTE — ED ADULT TRIAGE NOTE - CHIEF COMPLAINT QUOTE
Sent by obgyradha Sullivan for possible mastitis. Was seen in ED yesterday for mastitis and put on PO antibiotics. last night temp went up to 105. Temp 100.1 in triage.  States she took 1 gram of Tylenol at 1pm. Was seen in ED yesterday for mastitis and put on PO antibiotics. last night temp went up to 105. Temp 100.1 in triage.  States she took 1 gram of Tylenol at 1pm. Sent by obgyn Dr. Sullivan for admission. Was seen in ED yesterday for mastitis and put on PO antibiotics. Last night temp went up to 105. Temp 100.1 in triage. States she took 1 gram of Tylenol at 1pm. Sent by obgyn Dr. Sullivan for admission. Tested negative for covid on April 15th.

## 2020-04-27 NOTE — ED PROVIDER NOTE - GASTROINTESTINAL, MLM
Abdomen soft, non-tender, no guarding. Well healing surgical scar c minimal tenderness at right margin.  No erythema or dc.  Abd soft.

## 2020-04-27 NOTE — H&P ADULT - NSHPPHYSICALEXAM_GEN_ALL_CORE
Vital Signs Last 24 Hrs  T(C): 37.8 (27 Apr 2020 14:23), Max: 39.4 (26 Apr 2020 15:30)  T(F): 100.1 (27 Apr 2020 14:23), Max: 103 (26 Apr 2020 15:30)  HR: 134 (27 Apr 2020 14:23) (101 - 134)  BP: 121/49 (27 Apr 2020 14:23) (110/51 - 121/49)  BP(mean): --  RR: 19 (27 Apr 2020 14:23) (16 - 19)  SpO2: 100% (27 Apr 2020 14:23) (98% - 100%)    Physical Exam:   General: sitting comfortably in bed, NAD   CV: RRR  Lungs: CTAB  Chest: b/l breast engorgement with minimal overlaying erythema, no focal erythema/fluctuance  Abd: Soft, non-tender, non-distended, no fundal tenderness, no rebound/guarding  : deferred as patient is without complaints    Ext: non-tender b/l, no edema

## 2020-04-27 NOTE — ED ADULT NURSE NOTE - OBJECTIVE STATEMENT
patient  Alert & ox3,was seen in ED yesterday for mastitis and put on PO antibiotics. Last night temp went up to 105. Temp 100.1 in triage. States she took 1 gram of Tylenol at 1pm. Sent by obgyn  for admission. pt . evaluated by MD. Placed 20g angiocath Lt. AC., labs drawn and sent.  patient will be waiting for results, further evaluation and disposition.  made comfortable.will continue to monitor.

## 2020-04-27 NOTE — ED ADULT NURSE REASSESSMENT NOTE - NS ED NURSE REASSESS COMMENT FT1
pt is alert and oriented, breathing even and unlabored. pt assisted to bedpan, cleaned, linen changed. pt refused morphine for pain relief, stated "I don't want to feel drugged." pt teaching conducted regarding pain medicine and pain control. pt stated understanding. pt refused food offered at this time. report given to [facility], pt transported by ED tech.

## 2020-04-27 NOTE — CONSULT NOTE ADULT - SUBJECTIVE AND OBJECTIVE BOX
Gyn Consult Note    NATASHA STEARNS  24y  Female [id number]    HPI: 24y  POD#12 s/p uncomplicated rLTCS for labor presenting with fever. Patient seen in ED yesterday with fevers, tachycardia, dizziness. At that time the patient what thought to have mastitis and discharged with dicloxacillin. Patient called office today to reports fevers of 105 max while on PO antibiotics and Tylenol around the clock. Presenting to ED as instructed for additional work up. Patient reports fevers and chills. Reports worsening of dry cough this morning though reports cough was present at time of delivery. Denies chest pain, shortness of breath, abdominal pain, urinary symptoms, changes in bowel movements, purulent vaginal discharge. Endorses lochia consistent with her normal menses. Patient denies focal areas of redness/tenderness/fluctuance. Patient reports she had mastitis this pregnancy prior to delivery and this feels diffierent. She is pumping regularly. She is concerned about possible COVID exposure as she walks through the hospital daily to visit her baby in NICU.     OB/GYN Provider: Dr. Sullivan    OBHx: 2019 pLTCS for arrest,  rLTCS (short interval between pregnancies)  GYNHx: Dneies  PMH: Denies  PSH: C/S x2  Rx: PNV, Tylenol, Zinc  All: NKDA  Psych Hx: Denies  Social Hx: Denies x3    ROS Negative unless otherwise noted in HPI      Vital Signs Last 24 Hrs  T(C): 37.8 (2020 14:23), Max: 39.4 (2020 15:30)  T(F): 100.1 (2020 14:23), Max: 103 (2020 15:30)  HR: 134 (2020 14:23) (101 - 134)  BP: 121/49 (2020 14:23) (110/51 - 121/49)  BP(mean): --  RR: 19 (2020 14:23) (16 - 19)  SpO2: 100% (2020 14:23) (98% - 100%)    Physical Exam:   General: sitting comfortably in bed, NAD   CV: RRR  Lungs: CTAB  Chest: b/l breast engorgement with minimal overlaying erythema, no focal erythema/fluctuance  Abd: Soft, non-tender, non-distended, no fundal tenderness, no rebound/guarding  : deferred as patient is without complaints    Ext: non-tender b/l, no edema     LABS:                              10.8   16.92 )-----------( 222      ( 2020 12:00 )             33.3         137  |  99  |  9   ----------------------------<  95  3.0<L>   |  20<L>  |  0.57    Ca    9.6      2020 12:00    TPro  7.5  /  Alb  3.9  /  TBili  0.4  /  DBili  x   /  AST  23  /  ALT  23  /  AlkPhos  87      I&O's Detail      Urinalysis Basic - ( 2020 12:00 )    Color: YELLOW / Appearance: CLEAR / S.030 / pH: 6.5  Gluc: NEGATIVE / Ketone: LARGE  / Bili: NEGATIVE / Urobili: TRACE   Blood: MODERATE / Protein: 20 / Nitrite: NEGATIVE   Leuk Esterase: SMALL / RBC: >50 / WBC 11-25   Sq Epi: OCC / Non Sq Epi: x / Bacteria: NEGATIVE

## 2020-04-27 NOTE — ED PROVIDER NOTE - PHYSICAL EXAMINATION
Physical Exam:    I have reviewed the triage vital signs.    Gen: NAD, AOx3, non-toxic appearing, able to ambulate without assistance  Head and Neck: NCAT, Neck supple without meningismus   HEENT: EOMI, PEERLA, normal conjunctiva, tongue midline, oral mucosa moist  Lung: CTAB, no respiratory distress, no wheezes/rhonchi/rales B/L, speaking in full sentences  CV: RRR, no murmurs, rubs or gallops  Abd: soft, NT, ND, no guarding, no rigidity, no rebound tenderness, no CVA tenderness, no masses. Well healing abd scar from recent c section.   MSK: no gross deformities, ROM normal in UE/LE, no back tenderness  Neuro: CNs II-XII grossly intact. No focal sensory or motor deficits  Skin: Warm, well perfused, no rash, no leg swelling.  Psych: Appropriate mood and affect.   Breast exam: engorgement breast bilaterally, no erythema, no abbess, no purulent drainage, no crepitus. Physical Exam:    I have reviewed the triage vital signs.    Gen: NAD, AOx3, non-toxic appearing, able to ambulate without assistance  Head and Neck: NCAT, Neck supple without meningismus   HEENT: EOMI, PEERLA, normal conjunctiva, tongue midline, oral mucosa moist  Lung: CTAB, no respiratory distress, no wheezes/rhonchi/rales B/L, speaking in full sentences  CV: RRR, no murmurs, rubs or gallops  Abd: soft, NT, ND, no guarding, no rigidity, no rebound tenderness, no CVA tenderness, no masses. Well healing abd scar from recent c section.   MSK: no gross deformities, ROM normal in UE/LE, no back tenderness  Neuro: CNs II-XII grossly intact. No focal sensory or motor deficits  Skin: Warm, well perfused, no rash, no leg swelling.  Psych: Appropriate mood and affect.   Breast exam: engorgement breast bilaterally, no erythema, no abbess, no purulent drainage, no crepitus Physical Exam:    I have reviewed the triage vital signs.    Gen: NAD, AOx3, non-toxic appearing, able to ambulate without assistance  Head and Neck: NCAT, Neck supple without meningismus   HEENT: EOMI, PEERLA, normal conjunctiva, tongue midline, oral mucosa moist  Lung: CTAB, no respiratory distress, no wheezes/rhonchi/rales B/L, speaking in full sentences  CV: RRR, no murmurs, rubs or gallops  Abd: soft, NT, ND, no guarding, no rigidity, no rebound tenderness, no CVA tenderness, no masses. Well healing abd scar from recent c section.   MSK: no gross deformities, ROM normal in UE/LE, no back tenderness  Neuro: CNs II-XII grossly intact. No focal sensory or motor deficits  Skin: Warm, well perfused, no rash, no leg swelling.  Psych: Appropriate mood and affect.   Breast exam: engorgement breast bilaterally, no erythema, no abbess, no purulent drainage, no crepitus  (Chaperone: MD Aris.)

## 2020-04-27 NOTE — ED ADULT NURSE NOTE - CHIEF COMPLAINT QUOTE
Was seen in ED yesterday for mastitis and put on PO antibiotics. Last night temp went up to 105. Temp 100.1 in triage. States she took 1 gram of Tylenol at 1pm. Sent by obgyn Dr. Sullivan for admission. Tested negative for covid on April 15th.

## 2020-04-27 NOTE — ED PROVIDER NOTE - CLINICAL SUMMARY MEDICAL DECISION MAKING FREE TEXT BOX
Susan: Adult female with fever and cough. Delivered c section 12 days ago. No abd pain. No breast pain. Unclear source of fever from physical exam. Plan: CTAP, OBGYN c/s, repeat blood cultures, fever control, COVID test. Adult female, recently post c sxn, p/with fever and cough. Delivered c section 12 days ago. No abd pain. No breast pain. No SOB or pleurisy.  Unclear source of fever from physical exam though would consider significant risk of COVID given exposures and symptoms. Plan: CTAP at request of Ob c/s, OBGYN c/s, repeat blood cultures, fever control, COVID test.

## 2020-04-27 NOTE — CONSULT NOTE ADULT - ASSESSMENT
A/P: 24y  POD#12 s/p uncomplicated rLTCS for labor presenting with fever and tachycardia not responsive to tylenol and dicloxacillin which was prescribed yesterday for presumed mastitis. Patient tachycardic to 130s, temperature of 100.1, satting 100% on RA. Exam with mild erythema over bilateral breasts, no focal findings. Benign abdominal exam. Lungs clear. Unclear etiology of fevers/tachycardia postpartum

## 2020-04-28 DIAGNOSIS — N61.0 MASTITIS WITHOUT ABSCESS: ICD-10-CM

## 2020-04-28 LAB
ALBUMIN SERPL ELPH-MCNC: 2.8 G/DL — LOW (ref 3.3–5)
ALP SERPL-CCNC: 76 U/L — SIGNIFICANT CHANGE UP (ref 40–120)
ALT FLD-CCNC: 49 U/L — HIGH (ref 4–33)
ANION GAP SERPL CALC-SCNC: 7 MMO/L — SIGNIFICANT CHANGE UP (ref 7–14)
ANISOCYTOSIS BLD QL: SLIGHT — SIGNIFICANT CHANGE UP
APTT BLD: 31.9 SEC — SIGNIFICANT CHANGE UP (ref 27.5–36.3)
AST SERPL-CCNC: 46 U/L — HIGH (ref 4–32)
BASOPHILS # BLD AUTO: 0.03 K/UL — SIGNIFICANT CHANGE UP (ref 0–0.2)
BASOPHILS NFR BLD AUTO: 0.3 % — SIGNIFICANT CHANGE UP (ref 0–2)
BASOPHILS NFR SPEC: 0.8 % — SIGNIFICANT CHANGE UP (ref 0–2)
BILIRUB SERPL-MCNC: < 0.2 MG/DL — LOW (ref 0.2–1.2)
BLASTS # FLD: 0 % — SIGNIFICANT CHANGE UP (ref 0–0)
BUN SERPL-MCNC: 7 MG/DL — SIGNIFICANT CHANGE UP (ref 7–23)
CALCIUM SERPL-MCNC: 9.1 MG/DL — SIGNIFICANT CHANGE UP (ref 8.4–10.5)
CHLORIDE SERPL-SCNC: 105 MMOL/L — SIGNIFICANT CHANGE UP (ref 98–107)
CO2 SERPL-SCNC: 20 MMOL/L — LOW (ref 22–31)
CREAT SERPL-MCNC: 0.38 MG/DL — LOW (ref 0.5–1.3)
EOSINOPHIL # BLD AUTO: 0.04 K/UL — SIGNIFICANT CHANGE UP (ref 0–0.5)
EOSINOPHIL NFR BLD AUTO: 0.4 % — SIGNIFICANT CHANGE UP (ref 0–6)
EOSINOPHIL NFR FLD: 0.8 % — SIGNIFICANT CHANGE UP (ref 0–6)
FIBRINOGEN PPP-MCNC: 626 MG/DL — HIGH (ref 300–520)
GIANT PLATELETS BLD QL SMEAR: PRESENT — SIGNIFICANT CHANGE UP
GLUCOSE SERPL-MCNC: 93 MG/DL — SIGNIFICANT CHANGE UP (ref 70–99)
HCT VFR BLD CALC: 30 % — LOW (ref 34.5–45)
HGB BLD-MCNC: 9.7 G/DL — LOW (ref 11.5–15.5)
IMM GRANULOCYTES NFR BLD AUTO: 0.8 % — SIGNIFICANT CHANGE UP (ref 0–1.5)
INR BLD: 1.08 — SIGNIFICANT CHANGE UP (ref 0.88–1.17)
LYMPHOCYTES # BLD AUTO: 0.8 K/UL — LOW (ref 1–3.3)
LYMPHOCYTES # BLD AUTO: 8.7 % — LOW (ref 13–44)
LYMPHOCYTES NFR SPEC AUTO: 6.5 % — LOW (ref 13–44)
MACROCYTES BLD QL: SLIGHT — SIGNIFICANT CHANGE UP
MCHC RBC-ENTMCNC: 28.2 PG — SIGNIFICANT CHANGE UP (ref 27–34)
MCHC RBC-ENTMCNC: 32.3 % — SIGNIFICANT CHANGE UP (ref 32–36)
MCV RBC AUTO: 87.2 FL — SIGNIFICANT CHANGE UP (ref 80–100)
METAMYELOCYTES # FLD: 0 % — SIGNIFICANT CHANGE UP (ref 0–1)
MONOCYTES # BLD AUTO: 0.4 K/UL — SIGNIFICANT CHANGE UP (ref 0–0.9)
MONOCYTES NFR BLD AUTO: 4.4 % — SIGNIFICANT CHANGE UP (ref 2–14)
MONOCYTES NFR BLD: 4 % — SIGNIFICANT CHANGE UP (ref 2–9)
MYELOCYTES NFR BLD: 0 % — SIGNIFICANT CHANGE UP (ref 0–0)
NEUTROPHIL AB SER-ACNC: 66.1 % — SIGNIFICANT CHANGE UP (ref 43–77)
NEUTROPHILS # BLD AUTO: 7.83 K/UL — HIGH (ref 1.8–7.4)
NEUTROPHILS NFR BLD AUTO: 85.4 % — HIGH (ref 43–77)
NEUTS BAND # BLD: 18.6 % — HIGH (ref 0–6)
NRBC # FLD: 0 K/UL — SIGNIFICANT CHANGE UP (ref 0–0)
OTHER - HEMATOLOGY %: 0 — SIGNIFICANT CHANGE UP
PLATELET # BLD AUTO: 143 K/UL — LOW (ref 150–400)
PLATELET COUNT - ESTIMATE: SIGNIFICANT CHANGE UP
PMV BLD: 11.4 FL — SIGNIFICANT CHANGE UP (ref 7–13)
POIKILOCYTOSIS BLD QL AUTO: SLIGHT — SIGNIFICANT CHANGE UP
POLYCHROMASIA BLD QL SMEAR: SLIGHT — SIGNIFICANT CHANGE UP
POTASSIUM SERPL-MCNC: 3.3 MMOL/L — LOW (ref 3.5–5.3)
POTASSIUM SERPL-SCNC: 3.3 MMOL/L — LOW (ref 3.5–5.3)
PROMYELOCYTES # FLD: 0 % — SIGNIFICANT CHANGE UP (ref 0–0)
PROT SERPL-MCNC: 6.2 G/DL — SIGNIFICANT CHANGE UP (ref 6–8.3)
PROTHROM AB SERPL-ACNC: 12.3 SEC — SIGNIFICANT CHANGE UP (ref 9.8–13.1)
RBC # BLD: 3.44 M/UL — LOW (ref 3.8–5.2)
RBC # FLD: 13 % — SIGNIFICANT CHANGE UP (ref 10.3–14.5)
SMUDGE CELLS # BLD: PRESENT — SIGNIFICANT CHANGE UP
SODIUM SERPL-SCNC: 132 MMOL/L — LOW (ref 135–145)
VARIANT LYMPHS # BLD: 3.2 % — SIGNIFICANT CHANGE UP
WBC # BLD: 9.17 K/UL — SIGNIFICANT CHANGE UP (ref 3.8–10.5)
WBC # FLD AUTO: 9.17 K/UL — SIGNIFICANT CHANGE UP (ref 3.8–10.5)

## 2020-04-28 RX ORDER — OXYCODONE HYDROCHLORIDE 5 MG/1
5 TABLET ORAL EVERY 4 HOURS
Refills: 0 | Status: DISCONTINUED | OUTPATIENT
Start: 2020-04-28 | End: 2020-04-29

## 2020-04-28 RX ORDER — POTASSIUM CHLORIDE 20 MEQ
20 PACKET (EA) ORAL
Refills: 0 | Status: COMPLETED | OUTPATIENT
Start: 2020-04-28 | End: 2020-04-28

## 2020-04-28 RX ORDER — ACETAMINOPHEN 500 MG
975 TABLET ORAL EVERY 6 HOURS
Refills: 0 | Status: DISCONTINUED | OUTPATIENT
Start: 2020-04-28 | End: 2020-04-29

## 2020-04-28 RX ORDER — DIPHENHYDRAMINE HCL 50 MG
25 CAPSULE ORAL EVERY 4 HOURS
Refills: 0 | Status: DISCONTINUED | OUTPATIENT
Start: 2020-04-28 | End: 2020-04-29

## 2020-04-28 RX ORDER — SIMETHICONE 80 MG/1
80 TABLET, CHEWABLE ORAL
Refills: 0 | Status: DISCONTINUED | OUTPATIENT
Start: 2020-04-28 | End: 2020-04-29

## 2020-04-28 RX ORDER — LANOLIN
1 OINTMENT (GRAM) TOPICAL THREE TIMES A DAY
Refills: 0 | Status: DISCONTINUED | OUTPATIENT
Start: 2020-04-28 | End: 2020-04-29

## 2020-04-28 RX ORDER — HEPARIN SODIUM 5000 [USP'U]/ML
5000 INJECTION INTRAVENOUS; SUBCUTANEOUS EVERY 12 HOURS
Refills: 0 | Status: DISCONTINUED | OUTPATIENT
Start: 2020-04-28 | End: 2020-04-29

## 2020-04-28 RX ADMIN — Medication 250 MILLIGRAM(S): at 19:48

## 2020-04-28 RX ADMIN — PIPERACILLIN AND TAZOBACTAM 25 GRAM(S): 4; .5 INJECTION, POWDER, LYOPHILIZED, FOR SOLUTION INTRAVENOUS at 15:20

## 2020-04-28 RX ADMIN — PIPERACILLIN AND TAZOBACTAM 25 GRAM(S): 4; .5 INJECTION, POWDER, LYOPHILIZED, FOR SOLUTION INTRAVENOUS at 00:41

## 2020-04-28 RX ADMIN — Medication 20 MILLIEQUIVALENT(S): at 12:10

## 2020-04-28 RX ADMIN — Medication 200 MILLIGRAM(S): at 18:18

## 2020-04-28 RX ADMIN — Medication 20 MILLIEQUIVALENT(S): at 13:36

## 2020-04-28 RX ADMIN — PIPERACILLIN AND TAZOBACTAM 25 GRAM(S): 4; .5 INJECTION, POWDER, LYOPHILIZED, FOR SOLUTION INTRAVENOUS at 08:01

## 2020-04-28 RX ADMIN — Medication 20 MILLIEQUIVALENT(S): at 10:15

## 2020-04-28 RX ADMIN — Medication 975 MILLIGRAM(S): at 00:41

## 2020-04-28 RX ADMIN — Medication 250 MILLIGRAM(S): at 06:12

## 2020-04-28 RX ADMIN — SODIUM CHLORIDE 125 MILLILITER(S): 9 INJECTION, SOLUTION INTRAVENOUS at 06:12

## 2020-04-28 RX ADMIN — HEPARIN SODIUM 5000 UNIT(S): 5000 INJECTION INTRAVENOUS; SUBCUTANEOUS at 12:09

## 2020-04-28 NOTE — PROGRESS NOTE ADULT - SUBJECTIVE AND OBJECTIVE BOX
R3 Antepartum Note, HD#    Interval events:    Patient seen and examined at bedside, no acute overnight events. No acute complaints. Pt reports +FM, denies LOF, VB, ctx, HA, epigastric pain, blurred vision, CP, SOB, N/V, fevers, and chills.    Vital Signs Last 24 Hours  T(C): 36.9 (04-28-20 @ 09:46), Max: 37.8 (04-27-20 @ 14:23)  HR: 74 (04-28-20 @ 09:46) (74 - 134)  BP: 110/64 (04-28-20 @ 09:46) (97/53 - 121/49)  RR: 15 (04-28-20 @ 09:46) (15 - 19)  SpO2: 100% (04-28-20 @ 09:46) (98% - 100%)    CAPILLARY BLOOD GLUCOSE          Physical Exam:  General: NAD  Abdomen: Soft, non-tender, gravid  Ext: No pain or swelling    NST reactive overnight    Labs:             9.7    9.17  )-----------( 143      ( 04-28 @ 05:57 )             30.0     04-28 @ 05:57    132  |  105  |  7   ----------------------------<  93  3.3   |  20  |  0.38    Ca    9.1      04-28 @ 05:57    TPro  6.2  /  Alb  2.8  /  TBili  < 0.2  /  DBili  x   /  AST  46  /  ALT  49  /  AlkPhos  76  04-28 @ 05:57    PT/INR - ( 04-28 @ 05:57 )   PT: 12.3 SEC;   INR: 1.08     PTT - ( 04-28 @ 05:57 )  PTT:31.9 SEC    Uric Acid: (04-28 @ 05:57)  --       Fibrinogen: (04-28 @ 05:57)  626.0    LDH: (04-28 @ 05:57)  --         MEDICATIONS  (STANDING):  heparin   Injectable 5000 Unit(s) SubCutaneous every 12 hours  lactated ringers. 1000 milliLiter(s) (125 mL/Hr) IV Continuous <Continuous>  lanolin Ointment 1 Application(s) Topical three times a day  piperacillin/tazobactam IVPB.. 3.375 Gram(s) IV Intermittent every 8 hours  potassium chloride    Tablet ER 20 milliEquivalent(s) Oral every 2 hours  vancomycin  IVPB 1000 milliGRAM(s) IV Intermittent every 12 hours    MEDICATIONS  (PRN):  acetaminophen   Tablet .. 975 milliGRAM(s) Oral every 6 hours PRN Mild Pain (1 - 3)  diphenhydrAMINE 25 milliGRAM(s) Oral every 4 hours PRN Insomnia  oxyCODONE    IR 5 milliGRAM(s) Oral every 4 hours PRN Severe Pain (7 - 10)  simethicone 80 milliGRAM(s) Chew four times a day PRN Gas R3 Antepartum Note, HD#2    Patient seen and examined at bedside, no acute overnight events. No acute complaints.     Vital Signs Last 24 Hours  T(C): 36.9 (04-28-20 @ 09:46), Max: 37.8 (04-27-20 @ 14:23)  HR: 74 (04-28-20 @ 09:46) (74 - 134)  BP: 110/64 (04-28-20 @ 09:46) (97/53 - 121/49)  RR: 15 (04-28-20 @ 09:46) (15 - 19)  SpO2: 100% (04-28-20 @ 09:46) (98% - 100%)      Physical Exam:  General: NAD  Breasts: engorged, but no discrete areas of erythema or fluctuance  Abdomen: Soft, non-tender, nondistended  Incision: c/d/i with overlying steri strips  Ext: No pain or swelling      Labs:             9.7    9.17  )-----------( 143      ( 04-28 @ 05:57 )             30.0     04-28 @ 05:57    132  |  105  |  7   ----------------------------<  93  3.3   |  20  |  0.38    Ca    9.1      04-28 @ 05:57    TPro  6.2  /  Alb  2.8  /  TBili  < 0.2  /  DBili  x   /  AST  46  /  ALT  49  /  AlkPhos  76  04-28 @ 05:57    PT/INR - ( 04-28 @ 05:57 )   PT: 12.3 SEC;   INR: 1.08     PTT - ( 04-28 @ 05:57 )  PTT:31.9 SEC    Uric Acid: (04-28 @ 05:57)  --       Fibrinogen: (04-28 @ 05:57)  626.0    LDH: (04-28 @ 05:57)  --         MEDICATIONS  (STANDING):  heparin   Injectable 5000 Unit(s) SubCutaneous every 12 hours  lactated ringers. 1000 milliLiter(s) (125 mL/Hr) IV Continuous <Continuous>  lanolin Ointment 1 Application(s) Topical three times a day  piperacillin/tazobactam IVPB.. 3.375 Gram(s) IV Intermittent every 8 hours  potassium chloride    Tablet ER 20 milliEquivalent(s) Oral every 2 hours  vancomycin  IVPB 1000 milliGRAM(s) IV Intermittent every 12 hours    MEDICATIONS  (PRN):  acetaminophen   Tablet .. 975 milliGRAM(s) Oral every 6 hours PRN Mild Pain (1 - 3)  diphenhydrAMINE 25 milliGRAM(s) Oral every 4 hours PRN Insomnia  oxyCODONE    IR 5 milliGRAM(s) Oral every 4 hours PRN Severe Pain (7 - 10)  simethicone 80 milliGRAM(s) Chew four times a day PRN Gas

## 2020-04-28 NOTE — PROGRESS NOTE ADULT - SUBJECTIVE AND OBJECTIVE BOX
MFM Fellow    Patient seen at bedside w/ MFM attending Dr. Sullivan.  She reports feeling better, denies fever/chills, heavy vaginal bleeding, breast pain or abdominal pain.    Vital Signs Last 24 Hrs  T(C): 36.9 (28 Apr 2020 09:46), Max: 37.8 (27 Apr 2020 14:23)  T(F): 98.4 (28 Apr 2020 09:46), Max: 100.1 (27 Apr 2020 14:23)  HR: 74 (28 Apr 2020 09:46) (74 - 134)  BP: 110/64 (28 Apr 2020 09:46) (97/53 - 121/49)  BP(mean): --  RR: 15 (28 Apr 2020 09:46) (15 - 19)  SpO2: 100% (28 Apr 2020 09:46) (98% - 100%)  GA: NAD, A+OX3                          9.7    9.17  )-----------( 143      ( 28 Apr 2020 05:57 )             30.0   04-28    132<L>  |  105  |  7   ----------------------------<  93  3.3<L>   |  20<L>  |  0.38<L>    Ca    9.1      28 Apr 2020 05:57    TPro  6.2  /  Alb  2.8<L>  /  TBili  < 0.2<L>  /  DBili  x   /  AST  46<H>  /  ALT  49<H>  /  AlkPhos  76  04-28    PT/INR - ( 28 Apr 2020 05:57 )   PT: 12.3 SEC;   INR: 1.08          PTT - ( 28 Apr 2020 05:57 )  PTT:31.9 SEC    MEDICATIONS  (STANDING):  lactated ringers. 1000 milliLiter(s) (125 mL/Hr) IV Continuous <Continuous>  lanolin Ointment 1 Application(s) Topical three times a day  piperacillin/tazobactam IVPB.. 3.375 Gram(s) IV Intermittent every 8 hours  potassium chloride    Tablet ER 20 milliEquivalent(s) Oral every 2 hours  vancomycin  IVPB 1000 milliGRAM(s) IV Intermittent every 12 hours    MEDICATIONS  (PRN):  acetaminophen   Tablet .. 975 milliGRAM(s) Oral every 6 hours PRN Mild Pain (1 - 3)  diphenhydrAMINE 25 milliGRAM(s) Oral every 4 hours PRN Insomnia  oxyCODONE    IR 5 milliGRAM(s) Oral every 4 hours PRN Severe Pain (7 - 10)  simethicone 80 milliGRAM(s) Chew four times a day PRN Gas

## 2020-04-28 NOTE — PROGRESS NOTE ADULT - ATTENDING COMMENTS
PAtient seen and examined.  She was admitted with high fever at home and is S/P  section.  On evaluation  afebrile, no significant abdominal tenderness  Breasts mildly engorged  Will continue IV antibiotics

## 2020-04-29 ENCOUNTER — TRANSCRIPTION ENCOUNTER (OUTPATIENT)
Age: 25
End: 2020-04-29

## 2020-04-29 VITALS
SYSTOLIC BLOOD PRESSURE: 97 MMHG | TEMPERATURE: 98 F | RESPIRATION RATE: 16 BRPM | HEART RATE: 83 BPM | OXYGEN SATURATION: 99 % | DIASTOLIC BLOOD PRESSURE: 59 MMHG

## 2020-04-29 LAB
ALBUMIN SERPL ELPH-MCNC: 2.9 G/DL — LOW (ref 3.3–5)
ALP SERPL-CCNC: 75 U/L — SIGNIFICANT CHANGE UP (ref 40–120)
ALT FLD-CCNC: 48 U/L — HIGH (ref 4–33)
ANION GAP SERPL CALC-SCNC: 12 MMO/L — SIGNIFICANT CHANGE UP (ref 7–14)
APTT BLD: 31.5 SEC — SIGNIFICANT CHANGE UP (ref 27.5–36.3)
AST SERPL-CCNC: 36 U/L — HIGH (ref 4–32)
BASOPHILS # BLD AUTO: 0.04 K/UL — SIGNIFICANT CHANGE UP (ref 0–0.2)
BASOPHILS NFR BLD AUTO: 0.7 % — SIGNIFICANT CHANGE UP (ref 0–2)
BILIRUB SERPL-MCNC: < 0.2 MG/DL — LOW (ref 0.2–1.2)
BUN SERPL-MCNC: 5 MG/DL — LOW (ref 7–23)
CALCIUM SERPL-MCNC: 9.7 MG/DL — SIGNIFICANT CHANGE UP (ref 8.4–10.5)
CHLORIDE SERPL-SCNC: 107 MMOL/L — SIGNIFICANT CHANGE UP (ref 98–107)
CO2 SERPL-SCNC: 20 MMOL/L — LOW (ref 22–31)
CREAT SERPL-MCNC: 0.44 MG/DL — LOW (ref 0.5–1.3)
CULTURE RESULTS: NO GROWTH — SIGNIFICANT CHANGE UP
EOSINOPHIL # BLD AUTO: 0.21 K/UL — SIGNIFICANT CHANGE UP (ref 0–0.5)
EOSINOPHIL NFR BLD AUTO: 3.6 % — SIGNIFICANT CHANGE UP (ref 0–6)
GLUCOSE BLDC GLUCOMTR-MCNC: 90 MG/DL — SIGNIFICANT CHANGE UP (ref 70–99)
GLUCOSE SERPL-MCNC: 83 MG/DL — SIGNIFICANT CHANGE UP (ref 70–99)
HCT VFR BLD CALC: 32.8 % — LOW (ref 34.5–45)
HGB BLD-MCNC: 10.6 G/DL — LOW (ref 11.5–15.5)
IMM GRANULOCYTES NFR BLD AUTO: 0.9 % — SIGNIFICANT CHANGE UP (ref 0–1.5)
INR BLD: 0.95 — SIGNIFICANT CHANGE UP (ref 0.88–1.17)
LYMPHOCYTES # BLD AUTO: 1.24 K/UL — SIGNIFICANT CHANGE UP (ref 1–3.3)
LYMPHOCYTES # BLD AUTO: 21.2 % — SIGNIFICANT CHANGE UP (ref 13–44)
MCHC RBC-ENTMCNC: 28.2 PG — SIGNIFICANT CHANGE UP (ref 27–34)
MCHC RBC-ENTMCNC: 32.3 % — SIGNIFICANT CHANGE UP (ref 32–36)
MCV RBC AUTO: 87.2 FL — SIGNIFICANT CHANGE UP (ref 80–100)
MONOCYTES # BLD AUTO: 0.5 K/UL — SIGNIFICANT CHANGE UP (ref 0–0.9)
MONOCYTES NFR BLD AUTO: 8.6 % — SIGNIFICANT CHANGE UP (ref 2–14)
NEUTROPHILS # BLD AUTO: 3.8 K/UL — SIGNIFICANT CHANGE UP (ref 1.8–7.4)
NEUTROPHILS NFR BLD AUTO: 65 % — SIGNIFICANT CHANGE UP (ref 43–77)
NRBC # FLD: 0 K/UL — SIGNIFICANT CHANGE UP (ref 0–0)
PLATELET # BLD AUTO: 214 K/UL — SIGNIFICANT CHANGE UP (ref 150–400)
PMV BLD: 11.6 FL — SIGNIFICANT CHANGE UP (ref 7–13)
POTASSIUM SERPL-MCNC: 3.7 MMOL/L — SIGNIFICANT CHANGE UP (ref 3.5–5.3)
POTASSIUM SERPL-SCNC: 3.7 MMOL/L — SIGNIFICANT CHANGE UP (ref 3.5–5.3)
PROT SERPL-MCNC: 6.4 G/DL — SIGNIFICANT CHANGE UP (ref 6–8.3)
PROTHROM AB SERPL-ACNC: 10.9 SEC — SIGNIFICANT CHANGE UP (ref 9.8–13.1)
RBC # BLD: 3.76 M/UL — LOW (ref 3.8–5.2)
RBC # FLD: 13.3 % — SIGNIFICANT CHANGE UP (ref 10.3–14.5)
SODIUM SERPL-SCNC: 139 MMOL/L — SIGNIFICANT CHANGE UP (ref 135–145)
SPECIMEN SOURCE: SIGNIFICANT CHANGE UP
VANCOMYCIN TROUGH SERPL-MCNC: 5.6 UG/ML — LOW (ref 10–20)
WBC # BLD: 5.84 K/UL — SIGNIFICANT CHANGE UP (ref 3.8–10.5)
WBC # FLD AUTO: 5.84 K/UL — SIGNIFICANT CHANGE UP (ref 3.8–10.5)

## 2020-04-29 RX ADMIN — PIPERACILLIN AND TAZOBACTAM 25 GRAM(S): 4; .5 INJECTION, POWDER, LYOPHILIZED, FOR SOLUTION INTRAVENOUS at 01:15

## 2020-04-29 NOTE — DISCHARGE NOTE PROVIDER - NSDCMRMEDTOKEN_GEN_ALL_CORE_FT
acetaminophen 325 mg oral tablet: 3 tab(s) orally   ibuprofen 600 mg oral tablet: 1 tab(s) orally every 6 hours  PNV Prenatal oral tablet: 1 tab(s) orally once a day

## 2020-04-29 NOTE — PROGRESS NOTE ADULT - PROBLEM SELECTOR PLAN 2
-currently on vancomycin and zosyn for mastitis vs. endometritis  -Transition to PO antibiotics this AM. To be discussed with attending.  - TAUS (4/27): The endometrium is distended with heterogeneous debris. No   internal flow.   -No suspicion for retained POC  - daily CBC/CMP  - No standing antipyretics.    LASHON Reyes PGY3
- c/w vanc for presumed mastitis  - c/w zosyn for presumed endometritis  - TAUS (4/27): The endometrium is distended with heterogeneous debris. No   internal flow.   -No suspicion for retained POC  - daily CBC/CMP  - No standing antipyretics.  -Consider transitioning to PO abx 4/29    Pt seen and d/w JENNIFER Reyes PGY3

## 2020-04-29 NOTE — DISCHARGE NOTE PROVIDER - HOSPITAL COURSE
24y   POD#14 s/p uncomplicated rLTCS for labor presenting with tachycardia, fever, likely 2/2 mastitis vs. endometritis, not responsive to PO abx. Still a low clinical suspicion for endometritis, but given CT findings, pt is being empirically treated for both mastitis and endometritis. TAUS (): The endometrium is distended with heterogeneous debris.Bcx (, )-NGTD (p), covid neg, Leukocytosis downtrended 15.66->9.17->5. Discontinued Vanco/Zosyn this AM as patient has remained afebrile. Patient is stable for discharge home with fever monitoring and close outpatient follow up with Dr Sullivan.

## 2020-04-29 NOTE — DISCHARGE NOTE PROVIDER - CARE PROVIDER_API CALL
Santos Sullivan)  MaternalFetal Medicine; Obstetrics and Gynecology  [address] [place] [id number]  Phone: [phone]  Fax: [phone]  Follow Up Time:

## 2020-04-29 NOTE — PROGRESS NOTE ADULT - SUBJECTIVE AND OBJECTIVE BOX
R3 Antepartum Note, HD#3    Patient seen and examined at bedside, no acute overnight events. No acute complaints. Denies CP, SOB, N/V, fevers, and chills.    Vital Signs Last 24 Hours  T(C): 36.8 (04-29-20 @ 06:38), Max: 37.4 (04-28-20 @ 13:45)  HR: 83 (04-29-20 @ 06:38) (74 - 86)  BP: 97/59 (04-29-20 @ 06:38) (96/54 - 118/75)  RR: 16 (04-29-20 @ 06:38) (15 - 16)  SpO2: 99% (04-29-20 @ 06:38) (97% - 100%)    CAPILLARY BLOOD GLUCOSE      POCT Blood Glucose.: 90 mg/dL (29 Apr 2020 07:51)      Physical Exam:  General: NAD  Breasts: no erythema, fluctuance, induration  Abdomen: Soft, non-tender, non distended  Incision: c/d/i  Ext: No pain or swelling      Labs:             9.7    9.17  )-----------( 143      ( 04-28 @ 05:57 )             30.0     04-28 @ 05:57    132  |  105  |  7   ----------------------------<  93  3.3   |  20  |  0.38    Ca    9.1      04-28 @ 05:57    TPro  6.2  /  Alb  2.8  /  TBili  < 0.2  /  DBili  x   /  AST  46  /  ALT  49  /  AlkPhos  76  04-28 @ 05:57    PT/INR - ( 04-29 @ 07:00 )   PT: 10.9 SEC;   INR: 0.95     PTT - ( 04-29 @ 07:00 )  PTT:31.5 SEC    Uric Acid: (04-28 @ 05:57)  --       Fibrinogen: (04-28 @ 05:57)  626.0    LDH: (04-28 @ 05:57)  --         MEDICATIONS  (STANDING):  heparin   Injectable 5000 Unit(s) SubCutaneous every 12 hours  lanolin Ointment 1 Application(s) Topical three times a day    MEDICATIONS  (PRN):  acetaminophen   Tablet .. 975 milliGRAM(s) Oral every 6 hours PRN Mild Pain (1 - 3)  diphenhydrAMINE 25 milliGRAM(s) Oral every 4 hours PRN Insomnia  guaiFENesin   Syrup  (Sugar-Free) 200 milliGRAM(s) Oral every 6 hours PRN Cough  oxyCODONE    IR 5 milliGRAM(s) Oral every 4 hours PRN Severe Pain (7 - 10)  simethicone 80 milliGRAM(s) Chew four times a day PRN Gas

## 2020-04-29 NOTE — PROGRESS NOTE ADULT - ASSESSMENT
25 yo P2 POD 13 s/p repeat c/s admitted w/ fever, unclear source although endometritis is suspected  - patient clinically improving, WBC downtrending, will continue IV antibiotics   - breast exam unremarkable per Dr. Sullivan, CT images reviewed, low suspicion for retained POC  - SQH for DVT prophylaxis
4y  HD#3, POD#14 s/p uncomplicated rLTCS for labor presenting with sepsis (tachycardia, fever), likely 2/2 mastitis vs. endometritis, not responsive to PO abx. Still a low clinical suspicion for endometritis, but given CT findings, pt is being empirically treated for both mastitis and endometritis. VSS.
4y  HD#2, POD#13 s/p uncomplicated rLTCS for labor presenting with sepsis (tachycardia, fever), likely 2/2 mastitis vs. endometritis not responsive to PO abx. Still a  low clinical suspicion for endometritis, but given CT findings, pt to be empirically treated. VSS.

## 2020-04-29 NOTE — DISCHARGE NOTE PROVIDER - NSDCCPCAREPLAN_GEN_ALL_CORE_FT
PRINCIPAL DISCHARGE DIAGNOSIS  Diagnosis: Fever  Assessment and Plan of Treatment: - Monitor for fevers at home  - Return with increased pain, fever, chills, shortness or breath, cough  - Follow up with Dr. Sullivan Call for apt for telehealth visit

## 2020-04-29 NOTE — PROGRESS NOTE ADULT - PROBLEM SELECTOR PLAN 1
-VSS  -f/u bcx (4/26, 4/27)-NGTD (p)  -covid neg  -continue monitor fever curve  -Leukocytosis downtrended 15.66->9.17. F/u AM labs
-VSS  -f/u bcx (4/27)  -covid neg  -continue monitor fever curve  -Leukocytosis downtrended 15.66->9.17

## 2020-04-29 NOTE — DISCHARGE NOTE NURSING/CASE MANAGEMENT/SOCIAL WORK - PATIENT PORTAL LINK FT
You can access the FollowMyHealth Patient Portal offered by Health system by registering at the following website: [email or web address]. By joining [facility]’s FollowMyHealth portal, you will also be able to view your health information using other applications (apps) compatible with our system.

## 2020-08-31 ENCOUNTER — APPOINTMENT (OUTPATIENT)
Dept: OBGYN | Facility: CLINIC | Age: 25
End: 2020-08-31
Payer: MEDICAID

## 2020-08-31 PROCEDURE — 99213 OFFICE O/P EST LOW 20 MIN: CPT | Mod: 25

## 2020-08-31 PROCEDURE — 36415 COLL VENOUS BLD VENIPUNCTURE: CPT

## 2020-08-31 PROCEDURE — 81025 URINE PREGNANCY TEST: CPT

## 2020-09-03 ENCOUNTER — APPOINTMENT (OUTPATIENT)
Dept: OBGYN | Facility: CLINIC | Age: 25
End: 2020-09-03
Payer: MEDICAID

## 2020-09-03 PROCEDURE — 36415 COLL VENOUS BLD VENIPUNCTURE: CPT

## 2020-09-22 ENCOUNTER — APPOINTMENT (OUTPATIENT)
Dept: ANTEPARTUM | Facility: CLINIC | Age: 25
End: 2020-09-22

## 2020-09-25 ENCOUNTER — APPOINTMENT (OUTPATIENT)
Dept: ANTEPARTUM | Facility: CLINIC | Age: 25
End: 2020-09-25
Payer: MEDICAID

## 2020-09-25 PROCEDURE — 76801 OB US < 14 WKS SINGLE FETUS: CPT

## 2020-10-22 ENCOUNTER — APPOINTMENT (OUTPATIENT)
Dept: ANTEPARTUM | Facility: CLINIC | Age: 25
End: 2020-10-22
Payer: MEDICAID

## 2020-10-22 PROCEDURE — 76801 OB US < 14 WKS SINGLE FETUS: CPT

## 2020-10-22 PROCEDURE — 99072 ADDL SUPL MATRL&STAF TM PHE: CPT

## 2020-10-22 PROCEDURE — 76802 OB US < 14 WKS ADDL FETUS: CPT

## 2020-10-23 ENCOUNTER — APPOINTMENT (OUTPATIENT)
Dept: ANTEPARTUM | Facility: CLINIC | Age: 25
End: 2020-10-23

## 2020-10-26 ENCOUNTER — TRANSCRIPTION ENCOUNTER (OUTPATIENT)
Age: 25
End: 2020-10-26

## 2020-10-27 ENCOUNTER — INPATIENT (INPATIENT)
Facility: HOSPITAL | Age: 25
LOS: 0 days | Discharge: HOME | End: 2020-10-28
Attending: STUDENT IN AN ORGANIZED HEALTH CARE EDUCATION/TRAINING PROGRAM | Admitting: STUDENT IN AN ORGANIZED HEALTH CARE EDUCATION/TRAINING PROGRAM
Payer: MEDICAID

## 2020-10-27 ENCOUNTER — RESULT REVIEW (OUTPATIENT)
Age: 25
End: 2020-10-27

## 2020-10-27 VITALS
DIASTOLIC BLOOD PRESSURE: 66 MMHG | TEMPERATURE: 98 F | HEART RATE: 78 BPM | RESPIRATION RATE: 18 BRPM | SYSTOLIC BLOOD PRESSURE: 107 MMHG | OXYGEN SATURATION: 100 % | HEIGHT: 62 IN

## 2020-10-27 DIAGNOSIS — O03.5 GENITAL TRACT AND PELVIC INFECTION FOLLOWING COMPLETE OR UNSPECIFIED SPONTANEOUS ABORTION: ICD-10-CM

## 2020-10-27 DIAGNOSIS — Z98.891 HISTORY OF UTERINE SCAR FROM PREVIOUS SURGERY: Chronic | ICD-10-CM

## 2020-10-27 LAB
ALBUMIN SERPL ELPH-MCNC: 4.5 G/DL — SIGNIFICANT CHANGE UP (ref 3.3–5)
ALP SERPL-CCNC: 53 U/L — SIGNIFICANT CHANGE UP (ref 40–120)
ALT FLD-CCNC: 9 U/L — SIGNIFICANT CHANGE UP (ref 4–33)
ANION GAP SERPL CALC-SCNC: 13 MMO/L — SIGNIFICANT CHANGE UP (ref 7–14)
APPEARANCE UR: SIGNIFICANT CHANGE UP
APTT BLD: 34 SEC — SIGNIFICANT CHANGE UP (ref 27–36.3)
APTT BLD: 39.5 SEC — HIGH (ref 27–36.3)
AST SERPL-CCNC: 17 U/L — SIGNIFICANT CHANGE UP (ref 4–32)
BACTERIA # UR AUTO: SIGNIFICANT CHANGE UP
BASOPHILS # BLD AUTO: 0.03 K/UL — SIGNIFICANT CHANGE UP (ref 0–0.2)
BASOPHILS # BLD AUTO: 0.05 K/UL — SIGNIFICANT CHANGE UP (ref 0–0.2)
BASOPHILS NFR BLD AUTO: 0.7 % — SIGNIFICANT CHANGE UP (ref 0–2)
BASOPHILS NFR BLD AUTO: 0.9 % — SIGNIFICANT CHANGE UP (ref 0–2)
BILIRUB SERPL-MCNC: 0.3 MG/DL — SIGNIFICANT CHANGE UP (ref 0.2–1.2)
BILIRUB UR-MCNC: NEGATIVE — SIGNIFICANT CHANGE UP
BLD GP AB SCN SERPL QL: NEGATIVE — SIGNIFICANT CHANGE UP
BLOOD UR QL VISUAL: HIGH
BUN SERPL-MCNC: 9 MG/DL — SIGNIFICANT CHANGE UP (ref 7–23)
CALCIUM SERPL-MCNC: 9.4 MG/DL — SIGNIFICANT CHANGE UP (ref 8.4–10.5)
CHLORIDE SERPL-SCNC: 101 MMOL/L — SIGNIFICANT CHANGE UP (ref 98–107)
CO2 SERPL-SCNC: 23 MMOL/L — SIGNIFICANT CHANGE UP (ref 22–31)
COLOR SPEC: SIGNIFICANT CHANGE UP
CREAT SERPL-MCNC: 0.4 MG/DL — LOW (ref 0.5–1.3)
EOSINOPHIL # BLD AUTO: 0.06 K/UL — SIGNIFICANT CHANGE UP (ref 0–0.5)
EOSINOPHIL # BLD AUTO: 0.06 K/UL — SIGNIFICANT CHANGE UP (ref 0–0.5)
EOSINOPHIL NFR BLD AUTO: 1 % — SIGNIFICANT CHANGE UP (ref 0–6)
EOSINOPHIL NFR BLD AUTO: 1.4 % — SIGNIFICANT CHANGE UP (ref 0–6)
FIBRINOGEN PPP-MCNC: 300 MG/DL — SIGNIFICANT CHANGE UP (ref 290–520)
FIBRINOGEN PPP-MCNC: 413 MG/DL — SIGNIFICANT CHANGE UP (ref 290–520)
GLUCOSE SERPL-MCNC: 96 MG/DL — SIGNIFICANT CHANGE UP (ref 70–99)
GLUCOSE UR-MCNC: NEGATIVE — SIGNIFICANT CHANGE UP
HCG SERPL-ACNC: 3141 MIU/ML — SIGNIFICANT CHANGE UP
HCT VFR BLD CALC: 30.4 % — LOW (ref 34.5–45)
HCT VFR BLD CALC: 34.9 % — SIGNIFICANT CHANGE UP (ref 34.5–45)
HGB BLD-MCNC: 11.1 G/DL — LOW (ref 11.5–15.5)
HGB BLD-MCNC: 9.4 G/DL — LOW (ref 11.5–15.5)
IMM GRANULOCYTES NFR BLD AUTO: 0.2 % — SIGNIFICANT CHANGE UP (ref 0–1.5)
IMM GRANULOCYTES NFR BLD AUTO: 0.5 % — SIGNIFICANT CHANGE UP (ref 0–1.5)
INR BLD: 0.98 — SIGNIFICANT CHANGE UP (ref 0.88–1.16)
INR BLD: 1.05 — SIGNIFICANT CHANGE UP (ref 0.88–1.16)
KETONES UR-MCNC: NEGATIVE — SIGNIFICANT CHANGE UP
LEUKOCYTE ESTERASE UR-ACNC: SIGNIFICANT CHANGE UP
LYMPHOCYTES # BLD AUTO: 1.51 K/UL — SIGNIFICANT CHANGE UP (ref 1–3.3)
LYMPHOCYTES # BLD AUTO: 1.53 K/UL — SIGNIFICANT CHANGE UP (ref 1–3.3)
LYMPHOCYTES # BLD AUTO: 26.4 % — SIGNIFICANT CHANGE UP (ref 13–44)
LYMPHOCYTES # BLD AUTO: 36.1 % — SIGNIFICANT CHANGE UP (ref 13–44)
MCHC RBC-ENTMCNC: 27.2 PG — SIGNIFICANT CHANGE UP (ref 27–34)
MCHC RBC-ENTMCNC: 27.4 PG — SIGNIFICANT CHANGE UP (ref 27–34)
MCHC RBC-ENTMCNC: 30.9 % — LOW (ref 32–36)
MCHC RBC-ENTMCNC: 31.8 % — LOW (ref 32–36)
MCV RBC AUTO: 85.5 FL — SIGNIFICANT CHANGE UP (ref 80–100)
MCV RBC AUTO: 88.6 FL — SIGNIFICANT CHANGE UP (ref 80–100)
MONOCYTES # BLD AUTO: 0.35 K/UL — SIGNIFICANT CHANGE UP (ref 0–0.9)
MONOCYTES # BLD AUTO: 0.39 K/UL — SIGNIFICANT CHANGE UP (ref 0–0.9)
MONOCYTES NFR BLD AUTO: 6.7 % — SIGNIFICANT CHANGE UP (ref 2–14)
MONOCYTES NFR BLD AUTO: 8.4 % — SIGNIFICANT CHANGE UP (ref 2–14)
NEUTROPHILS # BLD AUTO: 2.21 K/UL — SIGNIFICANT CHANGE UP (ref 1.8–7.4)
NEUTROPHILS # BLD AUTO: 3.75 K/UL — SIGNIFICANT CHANGE UP (ref 1.8–7.4)
NEUTROPHILS NFR BLD AUTO: 52.9 % — SIGNIFICANT CHANGE UP (ref 43–77)
NEUTROPHILS NFR BLD AUTO: 64.8 % — SIGNIFICANT CHANGE UP (ref 43–77)
NITRITE UR-MCNC: NEGATIVE — SIGNIFICANT CHANGE UP
NRBC # FLD: 0 K/UL — SIGNIFICANT CHANGE UP (ref 0–0)
NRBC # FLD: 0 K/UL — SIGNIFICANT CHANGE UP (ref 0–0)
PH UR: 8 — SIGNIFICANT CHANGE UP (ref 5–8)
PLATELET # BLD AUTO: 110 K/UL — LOW (ref 150–400)
PLATELET # BLD AUTO: 145 K/UL — LOW (ref 150–400)
PMV BLD: 12 FL — SIGNIFICANT CHANGE UP (ref 7–13)
PMV BLD: 12.3 FL — SIGNIFICANT CHANGE UP (ref 7–13)
POTASSIUM SERPL-MCNC: 3.6 MMOL/L — SIGNIFICANT CHANGE UP (ref 3.5–5.3)
POTASSIUM SERPL-SCNC: 3.6 MMOL/L — SIGNIFICANT CHANGE UP (ref 3.5–5.3)
PROT SERPL-MCNC: 7.3 G/DL — SIGNIFICANT CHANGE UP (ref 6–8.3)
PROT UR-MCNC: 30 — SIGNIFICANT CHANGE UP
PROTHROM AB SERPL-ACNC: 11.2 SEC — SIGNIFICANT CHANGE UP (ref 10.6–13.6)
PROTHROM AB SERPL-ACNC: 12.1 SEC — SIGNIFICANT CHANGE UP (ref 10.6–13.6)
RBC # BLD: 3.43 M/UL — LOW (ref 3.8–5.2)
RBC # BLD: 4.08 M/UL — SIGNIFICANT CHANGE UP (ref 3.8–5.2)
RBC # FLD: 13 % — SIGNIFICANT CHANGE UP (ref 10.3–14.5)
RBC # FLD: 13.1 % — SIGNIFICANT CHANGE UP (ref 10.3–14.5)
RBC CASTS # UR COMP ASSIST: >50 — HIGH (ref 0–?)
RH IG SCN BLD-IMP: POSITIVE — SIGNIFICANT CHANGE UP
SARS-COV-2 RNA SPEC QL NAA+PROBE: SIGNIFICANT CHANGE UP
SODIUM SERPL-SCNC: 137 MMOL/L — SIGNIFICANT CHANGE UP (ref 135–145)
SP GR SPEC: 1.03 — SIGNIFICANT CHANGE UP (ref 1–1.04)
SQUAMOUS # UR AUTO: SIGNIFICANT CHANGE UP
UROBILINOGEN FLD QL: NORMAL — SIGNIFICANT CHANGE UP
WBC # BLD: 4.18 K/UL — SIGNIFICANT CHANGE UP (ref 3.8–10.5)
WBC # BLD: 5.79 K/UL — SIGNIFICANT CHANGE UP (ref 3.8–10.5)
WBC # FLD AUTO: 4.18 K/UL — SIGNIFICANT CHANGE UP (ref 3.8–10.5)
WBC # FLD AUTO: 5.79 K/UL — SIGNIFICANT CHANGE UP (ref 3.8–10.5)
WBC UR QL: HIGH (ref 0–?)

## 2020-10-27 PROCEDURE — 88305 TISSUE EXAM BY PATHOLOGIST: CPT | Mod: 26

## 2020-10-27 PROCEDURE — 58120 DILATION AND CURETTAGE: CPT

## 2020-10-27 PROCEDURE — 99284 EMERGENCY DEPT VISIT MOD MDM: CPT

## 2020-10-27 PROCEDURE — 76830 TRANSVAGINAL US NON-OB: CPT | Mod: 26

## 2020-10-27 RX ORDER — CEFOTETAN DISODIUM 1 G
2 VIAL (EA) INJECTION EVERY 12 HOURS
Refills: 0 | Status: DISCONTINUED | OUTPATIENT
Start: 2020-10-28 | End: 2020-10-28

## 2020-10-27 RX ORDER — METRONIDAZOLE 500 MG
500 TABLET ORAL EVERY 12 HOURS
Refills: 0 | Status: DISCONTINUED | OUTPATIENT
Start: 2020-10-27 | End: 2020-10-28

## 2020-10-27 RX ORDER — CEFOTETAN DISODIUM 1 G
2 VIAL (EA) INJECTION ONCE
Refills: 0 | Status: COMPLETED | OUTPATIENT
Start: 2020-10-27 | End: 2020-10-27

## 2020-10-27 RX ORDER — SODIUM CHLORIDE 9 MG/ML
1000 INJECTION INTRAMUSCULAR; INTRAVENOUS; SUBCUTANEOUS ONCE
Refills: 0 | Status: COMPLETED | OUTPATIENT
Start: 2020-10-27 | End: 2020-10-27

## 2020-10-27 RX ORDER — HEPARIN SODIUM 5000 [USP'U]/ML
5000 INJECTION INTRAVENOUS; SUBCUTANEOUS EVERY 12 HOURS
Refills: 0 | Status: DISCONTINUED | OUTPATIENT
Start: 2020-10-27 | End: 2020-10-28

## 2020-10-27 RX ORDER — INFLUENZA VIRUS VACCINE 15; 15; 15; 15 UG/.5ML; UG/.5ML; UG/.5ML; UG/.5ML
0.5 SUSPENSION INTRAMUSCULAR ONCE
Refills: 0 | Status: COMPLETED | OUTPATIENT
Start: 2020-10-27 | End: 2020-10-27

## 2020-10-27 RX ORDER — SODIUM CHLORIDE 9 MG/ML
1000 INJECTION, SOLUTION INTRAVENOUS
Refills: 0 | Status: DISCONTINUED | OUTPATIENT
Start: 2020-10-27 | End: 2020-10-27

## 2020-10-27 RX ORDER — CEFOTETAN DISODIUM 1 G
VIAL (EA) INJECTION
Refills: 0 | Status: DISCONTINUED | OUTPATIENT
Start: 2020-10-27 | End: 2020-10-28

## 2020-10-27 RX ADMIN — Medication 100 GRAM(S): at 21:06

## 2020-10-27 RX ADMIN — SODIUM CHLORIDE 1000 MILLILITER(S): 9 INJECTION INTRAMUSCULAR; INTRAVENOUS; SUBCUTANEOUS at 09:55

## 2020-10-27 RX ADMIN — Medication 0.2 MILLIGRAM(S): at 21:06

## 2020-10-27 RX ADMIN — SODIUM CHLORIDE 1000 MILLILITER(S): 9 INJECTION INTRAMUSCULAR; INTRAVENOUS; SUBCUTANEOUS at 12:30

## 2020-10-27 RX ADMIN — Medication 110 MILLIGRAM(S): at 22:01

## 2020-10-27 NOTE — H&P ADULT - NSHPLABSRESULTS_GEN_ALL_CORE
11.1   5.79  )-----------( 145      ( 27 Oct 2020 09:55 )             34.9     10-27    137  |  101  |  9   ----------------------------<  96  3.6   |  23  |  0.40<L>    Ca    9.4      27 Oct 2020 09:55    TPro  7.3  /  Alb  4.5  /  TBili  0.3  /  DBili  x   /  AST  17  /  ALT  9   /  AlkPhos  53  10-27    PT/INR - ( 27 Oct 2020 09:55 )   PT: 11.2 SEC;   INR: 0.98          PTT - ( 27 Oct 2020 09:55 )  PTT:34.0 SEC  Urinalysis Basic - ( 27 Oct 2020 10:00 )    Color: LIGHT ORANGE / Appearance: Lt TURBID / S.029 / pH: 8.0  Gluc: NEGATIVE / Ketone: NEGATIVE  / Bili: NEGATIVE / Urobili: NORMAL   Blood: LARGE / Protein: 30 / Nitrite: NEGATIVE   Leuk Esterase: TRACE / RBC: >50 / WBC 6-10   Sq Epi: MODERATE / Non Sq Epi: x / Bacteria: FEW    Blood Type: O Positive    < from: US Transvaginal (10.27.20 @ 10:54) >  EXAM:  US TRANSVAGINAL    PROCEDURE DATE:  Oct 27 2020     INTERPRETATION:  CLINICAL INFORMATION: Known fetal demise with vaginal bleeding and fevers.    LMP: 2019    COMPARISON: Ultrasound pelvis 2020    TECHNIQUE:  Endovaginal pelvic sonogram as per order. Transabdominal pelvic sonogram was also performed as part of our standard protocol.    FINDINGS:    Uterus: 10.6 x 7.1 x 9.6 cm. Cervix is closed.    Gestational sac: Elongated, low lying. Additional smaller cystic structure adjacent to the gestational sac near the fundus.  Crown Rump Length: 2.3 cm  Estimated Gestational Age: 9 weeks 0 days  Yolk Sac: 6 mm.  Fetal Heart Rate: No cardiac activity detected.    Right ovary: 3.4 x 2.5 x 1.6 cm. Within normal limits.  Leftovary: 3.5 x 1.5 x 3.6 cm. A 1.5 cm corpus luteal cyst is noted.    Fluid: None.    IMPRESSION:  Intrauterine gestation with CRL of 2.3 cm without fetal cardiac activity. Findings compatible with known fetal demise.    RINA SANDOVAL MD; Resident Radiology  This document has been electronically signed.  ELKIN BALL MD; Attending Radiologist  This document has been electronically signed. Oct 27 2020 11:27AM    < end of copied text >

## 2020-10-27 NOTE — H&P ADULT - NSHPREVIEWOFSYSTEMS_GEN_ALL_CORE
CONSTITUTIONAL: +fevers/chills; not currently  RESPIRATORY: No cough, wheezing, hemoptysis; No shortness of breath  CARDIOVASCULAR: No chest pain or palpitations  GASTROINTESTINAL: +nausea; not currently; No abdominal or epigastric pain. No vomiting, or hematemesis.  GENITOURINARY: +vaginal bleeding; No dysuria, frequency or hematuria  All other review of systems is negative unless indicated above.

## 2020-10-27 NOTE — ED ADULT TRIAGE NOTE - CHIEF COMPLAINT QUOTE
states" I was 3 months pregnant and had a miscarriage on Thursday and now I have fever of 101.3 this morning with back pain and foul smell with bleeding" I was seen by my doctor and he said that I still has the baby inside with no heart beat and he called it as miscarriage. took Tylenol this morning

## 2020-10-27 NOTE — H&P ADULT - HISTORY OF PRESENT ILLNESS
24yo  @ 13w0d (RAGHAV 21) dx with MAB last on 10/22, no FH, measuring 8wk presenting to ED with c/o fever at home to 101.7 F and vaginal bleeding covering bedsheets, patient, and partner. Pt reports waking up "soaked in blood." She felt chills/feverish at 4 AM and took her temp (101.7 F axillary). She took Tylenol. She additionally felt lightheaded at that time and nauseous. She continues to feel lightheaded but does not have nausea. Denies CP, SOB, HA. Bleeding has improved to spotting; pad x45 min with silver dollar sized blood stain; not saturated. PMSH notable for C/S 6mo ago c/b retained placenta s/p inpatient admission and treatment with antibiotics.

## 2020-10-27 NOTE — H&P ADULT - DOES THIS PATIENT HAVE A HISTORY OF OR HAS BEEN DX WITH HEART FAILURE?
no
14yo F with no sig PMH, BIB referred to ED form school after expressing to classmate thoughts of SI and cutting x1 a few days ago. Pt has no prior psychiatric hx. No out treatment Not prescribed any medications. Calm and cooperative in ED. Denies thoughts of hurting herself or others at this time.   Vaccines UTD, NKDA, no daily meds

## 2020-10-27 NOTE — ED PROVIDER NOTE - NS ED ROS FT
ROS:  GENERAL: +fever, +chills  EYES: no change in vision  HEENT: no trouble swallowing, no trouble speaking  CARDIAC: no chest pain  PULMONARY: no cough, no shortness of breath  GI: +abdominal pain, no nausea, no vomiting, no diarrhea, no constipation  : No dysuria, no frequency, +vaginal bleeding, +discharge  SKIN: no rashes  NEURO: no headache, no weakness  MSK: No joint pain

## 2020-10-27 NOTE — ED PROVIDER NOTE - PROGRESS NOTE DETAILS
Dr. Floyd: pt stable and in NAD; seen by OB and recommended for admission for D&C; pt informed of plan and agrees to plan; pt aware that she is NPO

## 2020-10-27 NOTE — H&P ADULT - ASSESSMENT
24yo  @ 13w0d (RAGHAV 21) dx with MAB last on 10/22, no FH, measuring 8wk presenting to ED with c/o fever at home to 101.7 F and heavy vaginal bleeding. Admitted with MAB.  Neuro: IV pain meds PRN  CV: Hemodynamically stable. Continue to closely monitor vital signs.  Pulm: Saturating well on room air   FEN: LR@125, NPO for OR.    : MAB -> OR for DVC.   -Blood type: O+, no need for rhogam.    Heme: SCD and aggressive early ambulation post procedure for DVT ppx.   ID: Doxycycline at time of procedure for infection ppx   Dispo: to OR for DVC     d/w Dr. Antonio Suarez R2

## 2020-10-27 NOTE — PRE-OP CHECKLIST - WARM FLUIDS/WARM BLANKETS
CERTIFICATE OF RETURN TO WORK    May 23, 2019      Re: Galina Biswas  [address] [address] [address]      This is to certify that Galina Biswas has been seen on 5/23/2019 and can return to work on 5-24-19    RESTRICTIONS: no lifting pushing pulling grasping greater than 10 pounds with left arm for 2 weeks          SIGNATURE:___________________________________________,   5/23/2019                                                                 MD Christopher Whitney MD [facility]  [address] [place] [address] [phone]       no

## 2020-10-27 NOTE — ED PROVIDER NOTE - ATTENDING CONTRIBUTION TO CARE
Dr. Floyd: 25 , 6 months s/p delivery with Dr. Sullivan, approx 12 weeks pregnant and recently diagnosed with fetal demise and in the process of scheduling outpatient D&C, in ED with fever, abdominal pain and vaginal bleeding since last night.  Pain and bleeding subsided now; pt took acetaminophen before coming to ED.  On exam pt well appearing, in NAD, heart RRR, lungs CTAB, abd NTND, extremities without swelling, strength 5/5 in all extremities and skin without rash.

## 2020-10-27 NOTE — H&P ADULT - NSHPPHYSICALEXAM_GEN_ALL_CORE
Gen: awake, alert, NAD, resting comfortably in bed  CV: RRR  Pulm: nonlabored breathing   Gastrointestinal: soft, non tender, nondistended  Genitourinary: NEFG  Speculum: scant old blood in posterior fornix  Cervix: closed/long, no CMT, no active bleeding from os, no gross lesions  Uterus: minimally tender fundus to palpation  Adnexa: non tender, no palpable masses  Ext: nontender, nonerythematous

## 2020-10-27 NOTE — ED ADULT NURSE NOTE - OBJECTIVE STATEMENT
Pt c/o vaginal bleeding and fever. Recent pregnancy with miscarriage. Pt states she has foul smell from bleeding. Blood work done, examined, US ordered, took Tylenol @ home.

## 2020-10-27 NOTE — CONSULT NOTE ADULT - SUBJECTIVE AND OBJECTIVE BOX
R2 GYNECOLOGY CONSULT NOTE    26yo  @ 13w0d (RAGHAV 21) dx with MAB last on 10/22, no FH, measuring 8wk presenting to ED with c/o fever at home to 101.7 F and vaginal bleeding covering bedsheets, patient, and partner.  Pt reports waking up "soaked in blood." She felt chills/feverish at 4 AM and took her temp (101.7 F axillary). She took Tylenol. She additionally felt lightheaded at that time and nauseous. She continues to feel lightheaded but does not have nausea.  Denies CP, SOB, HA. Bleeding has improved to spotting; pad x45 min with silver dollar sized blood stain; not saturated. PMSH notable for C/S 6mo ago c/b retained placenta s/p inpatient admission and treatment with antibiotics.     OB/GYN HISTORY:   PAST MEDICAL & SURGICAL HISTORY:  No pertinent past medical history    Stress    Previous  section  20 primary csection for failure to progress      Allergies    No Known Allergies    Intolerances      MEDICATIONS  (STANDING):    MEDICATIONS  (PRN):    FAMILY HISTORY:    SOCIAL HISTORY:  REVIEW OF SYSTEMS:    CONSTITUTIONAL: No weakness, fevers or chills  EYES/ENT: No visual changes;  No vertigo or throat pain   NECK: No pain or stiffness  RESPIRATORY: No cough, wheezing, hemoptysis; No shortness of breath  CARDIOVASCULAR: No chest pain or palpitations  GASTROINTESTINAL: No abdominal or epigastric pain. No nausea, vomiting, or hematemesis; No diarrhea or constipation. No melena or hematochezia.  GENITOURINARY: No dysuria, frequency or hematuria  NEUROLOGICAL: No numbness or weakness  SKIN: No itching, burning, rashes, or lesions   All other review of systems is negative unless indicated above.    Name of GYN Physician:  Date of Last Pap:  History of Abnormal Pap:  Date of Last Mammogram:  Date of Last Colonoscopy:    Vital Signs Last 24 Hrs  T(C): 36.5 (27 Oct 2020 09:27), Max: 36.5 (27 Oct 2020 09:27)  T(F): 97.7 (27 Oct 2020 09:27), Max: 97.7 (27 Oct 2020 09:27)  HR: 78 (27 Oct 2020 09:27) (78 - 78)  BP: 107/66 (27 Oct 2020 09:27) (107/66 - 107/66)  BP(mean): --  RR: 18 (27 Oct 2020 09:27) (18 - 18)  SpO2: 100% (27 Oct 2020 09:27) (100% - 100%)    PHYSICAL EXAM:      Constitutional: alert and oriented x 3    Breasts: no tenderness, no nodules    Respiratory: clear    Cardiovascular: regular rate and rhythm    Gastrointestinal: soft, non tender, + bowel sounds. No hepatosplenomegaly, no palpable masses    Genitourinary: NEFG  Cervix: closed/ long, no CMT  Uterus: normal size, non tender  Adnexa: non tender, no palpable masses    Rectal:     Extremities:    Neurological:    Skin:    Lymph Nodes:        LABS:                        11.1   5.79  )-----------( 145      ( 27 Oct 2020 09:55 )             34.9     10-27    137  |  101  |  9   ----------------------------<  96  3.6   |  23  |  0.40<L>    Ca    9.4      27 Oct 2020 09:55    TPro  7.3  /  Alb  4.5  /  TBili  0.3  /  DBili  x   /  AST  17  /  ALT  9   /  AlkPhos  53  10-27    PT/INR - ( 27 Oct 2020 09:55 )   PT: 11.2 SEC;   INR: 0.98          PTT - ( 27 Oct 2020 09:55 )  PTT:34.0 SEC  Urinalysis Basic - ( 27 Oct 2020 10:00 )    Color: LIGHT ORANGE / Appearance: Lt TURBID / S.029 / pH: 8.0  Gluc: NEGATIVE / Ketone: NEGATIVE  / Bili: NEGATIVE / Urobili: NORMAL   Blood: LARGE / Protein: 30 / Nitrite: NEGATIVE   Leuk Esterase: TRACE / RBC: >50 / WBC 6-10   Sq Epi: MODERATE / Non Sq Epi: x / Bacteria: FEW        Blood Type: O Positive      RADIOLOGY & ADDITIONAL STUDIES:           R2 GYNECOLOGY CONSULT NOTE    26yo  @ 13w0d (RAGHAV 21) dx with MAB last on 10/22, no FH, measuring 8wk presenting to ED with c/o fever at home to 101.7 F and vaginal bleeding covering bedsheets, patient, and partner.  Pt reports waking up "soaked in blood." She felt chills/feverish at 4 AM and took her temp (101.7 F axillary). She took Tylenol. She additionally felt lightheaded at that time and nauseous. She continues to feel lightheaded but does not have nausea.  Denies CP, SOB, HA. Bleeding has improved to spotting; pad x45 min with silver dollar sized blood stain; not saturated. PMSH notable for C/S 6mo ago c/b retained placenta s/p inpatient admission and treatment with antibiotics.     OB/GYN HISTORY:   G1  s/p C/S for failure to descend  G2  s/p repeat C/S; postop course c/b retained placenta s/p admission, IV abx  G3 current  denies fibroids, ov cysts, STIs, abnl Pap smears  OBGYN = [facility]ie    PMH: denies  PSH: C/S x2    Meds: PNV  Allergies: No Known Allergies    Soc: denies T/E/D  Psych: denies    REVIEW OF SYSTEMS:  CONSTITUTIONAL: +fevers/chills; not currently  RESPIRATORY: No cough, wheezing, hemoptysis; No shortness of breath  CARDIOVASCULAR: No chest pain or palpitations  GASTROINTESTINAL: +nausea; not currently; No abdominal or epigastric pain. No vomiting, or hematemesis.  GENITOURINARY: +vaginal bleeding; No dysuria, frequency or hematuria  All other review of systems is negative unless indicated above.    Vital Signs Last 24 Hrs  T(C): 36.5 (27 Oct 2020 09:27), Max: 36.5 (27 Oct 2020 09:27)  T(F): 97.7 (27 Oct 2020 09:27), Max: 97.7 (27 Oct 2020 09:27)  HR: 78 (27 Oct 2020 09:27) (78 - 78)  BP: 107/66 (27 Oct 2020 09:27) (107/66 - 107/66)  RR: 18 (27 Oct 2020 09:27) (18 - 18)  SpO2: 100% (27 Oct 2020 09:27) (100% - 100%)    PHYSICAL EXAM:  Gen: awake, alert, NAD, resting comfortably in bed  CV: RRR  Pulm: nonlabored breathing   Gastrointestinal: soft, non tender, nondistended  Genitourinary: NEFG  Speculum: scant old blood in posterior fornix  Cervix: closed/long, no CMT, no active bleeding from os, no gross lesions  Uterus: minimally tender fundus to palpation  Adnexa: non tender, no palpable masses  Ext: nontender, nonerythematous      LABS:                        11.1   5.79  )-----------( 145      ( 27 Oct 2020 09:55 )             34.9     10-27    137  |  101  |  9   ----------------------------<  96  3.6   |  23  |  0.40<L>    Ca    9.4      27 Oct 2020 09:55    TPro  7.3  /  Alb  4.5  /  TBili  0.3  /  DBili  x   /  AST  17  /  ALT  9   /  AlkPhos  53  10-27    PT/INR - ( 27 Oct 2020 09:55 )   PT: 11.2 SEC;   INR: 0.98          PTT - ( 27 Oct 2020 09:55 )  PTT:34.0 SEC  Urinalysis Basic - ( 27 Oct 2020 10:00 )    Color: LIGHT ORANGE / Appearance: Lt TURBID / S.029 / pH: 8.0  Gluc: NEGATIVE / Ketone: NEGATIVE  / Bili: NEGATIVE / Urobili: NORMAL   Blood: LARGE / Protein: 30 / Nitrite: NEGATIVE   Leuk Esterase: TRACE / RBC: >50 / WBC 6-10   Sq Epi: MODERATE / Non Sq Epi: x / Bacteria: FEW    Blood Type: O Positive      RADIOLOGY & ADDITIONAL STUDIES:

## 2020-10-27 NOTE — ED PROVIDER NOTE - CLINICAL SUMMARY MEDICAL DECISION MAKING FREE TEXT BOX
24 y/o F w/ fetal demise in outpatient setting now w/ fever. Plan labs, preops, covid swab, tvus, gyn c/s,a bx

## 2020-10-27 NOTE — CONSULT NOTE ADULT - ASSESSMENT
24yo  @ 13w0d (RAGHAV 21) w/MAB presenting with fevers at home and vaginal bleeding. Pt has been afebrile without leukocytosis on labwork. Minimal fundal tenderness and scant old blood in vault.  []Plan pending

## 2020-10-28 ENCOUNTER — TRANSCRIPTION ENCOUNTER (OUTPATIENT)
Age: 25
End: 2020-10-28

## 2020-10-28 VITALS
HEART RATE: 76 BPM | OXYGEN SATURATION: 100 % | DIASTOLIC BLOOD PRESSURE: 46 MMHG | TEMPERATURE: 99 F | SYSTOLIC BLOOD PRESSURE: 101 MMHG | RESPIRATION RATE: 17 BRPM

## 2020-10-28 LAB
BASOPHILS # BLD AUTO: 0.01 K/UL — SIGNIFICANT CHANGE UP (ref 0–0.2)
BASOPHILS # BLD AUTO: 0.02 K/UL — SIGNIFICANT CHANGE UP (ref 0–0.2)
BASOPHILS NFR BLD AUTO: 0.1 % — SIGNIFICANT CHANGE UP (ref 0–2)
BASOPHILS NFR BLD AUTO: 0.3 % — SIGNIFICANT CHANGE UP (ref 0–2)
CULTURE RESULTS: SIGNIFICANT CHANGE UP
EOSINOPHIL # BLD AUTO: 0 K/UL — SIGNIFICANT CHANGE UP (ref 0–0.5)
EOSINOPHIL # BLD AUTO: 0.01 K/UL — SIGNIFICANT CHANGE UP (ref 0–0.5)
EOSINOPHIL NFR BLD AUTO: 0 % — SIGNIFICANT CHANGE UP (ref 0–6)
EOSINOPHIL NFR BLD AUTO: 0.1 % — SIGNIFICANT CHANGE UP (ref 0–6)
HCT VFR BLD CALC: 27.2 % — LOW (ref 34.5–45)
HCT VFR BLD CALC: 29.5 % — LOW (ref 34.5–45)
HGB BLD-MCNC: 8.8 G/DL — LOW (ref 11.5–15.5)
HGB BLD-MCNC: 9.4 G/DL — LOW (ref 11.5–15.5)
IMM GRANULOCYTES NFR BLD AUTO: 0.4 % — SIGNIFICANT CHANGE UP (ref 0–1.5)
IMM GRANULOCYTES NFR BLD AUTO: 0.5 % — SIGNIFICANT CHANGE UP (ref 0–1.5)
LYMPHOCYTES # BLD AUTO: 0.55 K/UL — LOW (ref 1–3.3)
LYMPHOCYTES # BLD AUTO: 0.92 K/UL — LOW (ref 1–3.3)
LYMPHOCYTES # BLD AUTO: 12.9 % — LOW (ref 13–44)
LYMPHOCYTES # BLD AUTO: 8.3 % — LOW (ref 13–44)
MCHC RBC-ENTMCNC: 27.8 PG — SIGNIFICANT CHANGE UP (ref 27–34)
MCHC RBC-ENTMCNC: 27.8 PG — SIGNIFICANT CHANGE UP (ref 27–34)
MCHC RBC-ENTMCNC: 31.9 % — LOW (ref 32–36)
MCHC RBC-ENTMCNC: 32.4 % — SIGNIFICANT CHANGE UP (ref 32–36)
MCV RBC AUTO: 86.1 FL — SIGNIFICANT CHANGE UP (ref 80–100)
MCV RBC AUTO: 87.3 FL — SIGNIFICANT CHANGE UP (ref 80–100)
MONOCYTES # BLD AUTO: 0.08 K/UL — SIGNIFICANT CHANGE UP (ref 0–0.9)
MONOCYTES # BLD AUTO: 0.39 K/UL — SIGNIFICANT CHANGE UP (ref 0–0.9)
MONOCYTES NFR BLD AUTO: 1.2 % — LOW (ref 2–14)
MONOCYTES NFR BLD AUTO: 5.5 % — SIGNIFICANT CHANGE UP (ref 2–14)
NEUTROPHILS # BLD AUTO: 5.77 K/UL — SIGNIFICANT CHANGE UP (ref 1.8–7.4)
NEUTROPHILS # BLD AUTO: 5.97 K/UL — SIGNIFICANT CHANGE UP (ref 1.8–7.4)
NEUTROPHILS NFR BLD AUTO: 81 % — HIGH (ref 43–77)
NEUTROPHILS NFR BLD AUTO: 89.7 % — HIGH (ref 43–77)
NRBC # FLD: 0 K/UL — SIGNIFICANT CHANGE UP (ref 0–0)
NRBC # FLD: 0 K/UL — SIGNIFICANT CHANGE UP (ref 0–0)
PLATELET # BLD AUTO: 137 K/UL — LOW (ref 150–400)
PLATELET # BLD AUTO: 140 K/UL — LOW (ref 150–400)
PMV BLD: 12.3 FL — SIGNIFICANT CHANGE UP (ref 7–13)
PMV BLD: 12.6 FL — SIGNIFICANT CHANGE UP (ref 7–13)
RBC # BLD: 3.16 M/UL — LOW (ref 3.8–5.2)
RBC # BLD: 3.38 M/UL — LOW (ref 3.8–5.2)
RBC # FLD: 12.9 % — SIGNIFICANT CHANGE UP (ref 10.3–14.5)
RBC # FLD: 13 % — SIGNIFICANT CHANGE UP (ref 10.3–14.5)
SPECIMEN SOURCE: SIGNIFICANT CHANGE UP
WBC # BLD: 6.65 K/UL — SIGNIFICANT CHANGE UP (ref 3.8–10.5)
WBC # BLD: 7.13 K/UL — SIGNIFICANT CHANGE UP (ref 3.8–10.5)
WBC # FLD AUTO: 6.65 K/UL — SIGNIFICANT CHANGE UP (ref 3.8–10.5)
WBC # FLD AUTO: 7.13 K/UL — SIGNIFICANT CHANGE UP (ref 3.8–10.5)

## 2020-10-28 RX ADMIN — Medication 110 MILLIGRAM(S): at 11:46

## 2020-10-28 RX ADMIN — Medication 0.2 MILLIGRAM(S): at 01:21

## 2020-10-28 RX ADMIN — Medication 100 MILLIGRAM(S): at 05:19

## 2020-10-28 RX ADMIN — Medication 0.2 MILLIGRAM(S): at 10:08

## 2020-10-28 RX ADMIN — Medication 100 GRAM(S): at 10:47

## 2020-10-28 RX ADMIN — Medication 0.2 MILLIGRAM(S): at 14:12

## 2020-10-28 RX ADMIN — Medication 0.2 MILLIGRAM(S): at 05:18

## 2020-10-28 NOTE — DISCHARGE NOTE OB - CARE PLAN
Principal Discharge DX:	Endometritis following abortive pregnancy  Goal:	normal postoperative course  Assessment and plan of treatment:	normal diet and activity  Secondary Diagnosis:	Previous  section

## 2020-10-28 NOTE — DISCHARGE NOTE OB - MEDICATION SUMMARY - MEDICATIONS TO TAKE
I will START or STAY ON the medications listed below when I get home from the hospital:    acetaminophen 325 mg oral tablet  -- 3 tab(s) by mouth   -- Indication: For Endometritis following abortive pregnancy    ibuprofen 600 mg oral tablet  -- 1 tab(s) by mouth every 6 hours  -- Indication: For Endometritis following abortive pregnancy    PNV Prenatal oral tablet  -- 1 tab(s) by mouth once a day  -- Indication: For Endometritis following abortive pregnancy

## 2020-10-28 NOTE — DISCHARGE NOTE NURSING/CASE MANAGEMENT/SOCIAL WORK - PATIENT PORTAL LINK FT
You can access the FollowMyHealth Patient Portal offered by [facility] by registering at the following website: [email or web address]. By joining [employer]’s FollowMyHealth portal, you will also be able to view your health information using other applications (apps) compatible with our system.

## 2020-10-28 NOTE — DISCHARGE NOTE PROVIDER - NSDCFUADDINST_GEN_ALL_CORE_FT
Regular diet as tolerated, regular activity as tolerated, no heavy lifting for first two weeks.  Nothing per vagina: no intercourse, tampons or douching.  Call your provider if you experience fevers, chills, worsening abdominal pain, inability to urinate or worsening vaginal bleeding.  Monitor your temperature for fevers.  Follow up with your provider in 2 weeks.

## 2020-10-28 NOTE — DISCHARGE NOTE OB - PATIENT PORTAL LINK FT
You can access the FollowMyHealth Patient Portal offered by [facility] by registering at the following website: [email or web address]. By joining [employer]’s FollowMyHealth portal, you will also be able to view your health information using other applications (apps) compatible with our system.

## 2020-10-28 NOTE — PROGRESS NOTE ADULT - SUBJECTIVE AND OBJECTIVE BOX
POD#1   HD#2    Patient seen and examined at bedside, no acute overnight events. No acute complaints. Reports some lightheadedness when ambulating to bathroom. Denies abdominal/pelvic pain, vaginal bleeding, fevers/chills. Voiding freely, tolerating regular diet. Denies CP, SOB, N/V, fevers, and chills.    Vital Signs Last 24 Hours  T(C): 36.7 (10-28-20 @ 05:13), Max: 37.2 (10-27-20 @ 16:55)  HR: 68 (10-28-20 @ 05:13) (68 - 92)  BP: 100/55 (10-28-20 @ 05:13) (100/55 - 120/56)  RR: 18 (10-28-20 @ 05:13) (14 - 18)  SpO2: 99% (10-28-20 @ 05:13) (95% - 100%)    I&O's Summary    27 Oct 2020 07:01  -  28 Oct 2020 07:00  --------------------------------------------------------  IN: 1025 mL / OUT: 1050 mL / NET: -25 mL        Physical Exam:  General: NAD  CV: NR, RR, S1, S2, no M/R/G  Lungs: CTA-B  Abdomen: Soft, non-tender, non-distended  : clean pad  Ext: No pain or swelling    Labs:                        8.8    7.13  )-----------( 137      ( 28 Oct 2020 05:26 )             27.2   baso 0.1    eos 0.1    imm gran 0.4    lymph 12.9   mono 5.5    poly 81.0                         9.4    6.65  )-----------( 140      ( 27 Oct 2020 23:10 )             29.5   baso 0.3    eos 0.0    imm gran 0.5    lymph 8.3    mono 1.2    poly 89.7                         9.4    4.18  )-----------( 110      ( 27 Oct 2020 17:15 )             30.4   baso 0.7    eos 1.4    imm gran 0.5    lymph 36.1   mono 8.4    poly 52.9                         11.1   5.79  )-----------( 145      ( 27 Oct 2020 09:55 )             34.9   baso 0.9    eos 1.0    imm gran 0.2    lymph 26.4   mono 6.7    poly 64.8       MEDICATIONS  (STANDING):  cefoTEtan  IVPB      cefoTEtan  IVPB 2 Gram(s) IV Intermittent every 12 hours  doxycycline IVPB 100 milliGRAM(s) IV Intermittent every 12 hours  doxycycline IVPB      heparin   Injectable 5000 Unit(s) SubCutaneous every 12 hours  influenza   Vaccine 0.5 milliLiter(s) IntraMuscular once  methylergonovine 0.2 milliGRAM(s) Oral every 4 hours  metroNIDAZOLE  IVPB 500 milliGRAM(s) IV Intermittent every 12 hours    MEDICATIONS  (PRN):

## 2020-10-28 NOTE — PROGRESS NOTE ADULT - ASSESSMENT
26yo  p/w fevers at home, vaginal spotting, mild uterine tenderness in setting of MAB, now POD#1 s/p DVC,  s/p Methergine IM, TXA, currently completing Methergine series. Pt hemodynamically stable at this time.   Neuro: PO pain meds PRN  CV: Hemodynamically stable. Continue to closely monitor vital signs. Hgb: 9.4->9.4->8.8  Pulm: Saturating well on room air   FEN: SLIV, reg diet     : Voiding freely, c/w Methergine series   Heme: DVT ppx OOB, SCDs in bed   ID: c/w Cefotetan/Doxy/Flagyl -> will switch to PO  Dispo: poss d/c home with PO abx    Sue Daniela R2

## 2020-10-28 NOTE — DISCHARGE NOTE NURSING/CASE MANAGEMENT/SOCIAL WORK - NSDCPNINST_GEN_ALL_CORE
Call MD with any problems or questions. Notify MD with fever or inability to tolerate food or liquids.

## 2020-10-28 NOTE — DISCHARGE NOTE PROVIDER - CARE PROVIDER_API CALL
Santos Sullivan  MATERNAL/FETAL MEDICINE  [address] [place] [address]  Phone: [phone]  Fax: [phone]  Follow Up Time:

## 2020-10-28 NOTE — DISCHARGE NOTE PROVIDER - HOSPITAL COURSE
26yo  with known MAB dx @ 12w2d (measuring 8wks) presented to ED with c/o fevers at home to 101.7 F and vaginal bleeding. Pt was hemodynamically stable and confirmed to have MAB. She was taken back for DVC,  s/p IM Methergine and TXA. Please see operative note for details. She was kept on a Methergine series and started on IV abx for potential septic ab. Throughout her hospitalization, she remained afebrile. She tolerated regular diet, voiding freely, adequate pain control. She was discharged POD#1 with f/u with Dr. Sullivan. She was instructed to monitor her T for fevers.

## 2020-10-28 NOTE — DISCHARGE NOTE PROVIDER - NSDCCPCAREPLAN_GEN_ALL_CORE_FT
PRINCIPAL DISCHARGE DIAGNOSIS  Diagnosis: Endometritis following abortive pregnancy  Assessment and Plan of Treatment:       SECONDARY DISCHARGE DIAGNOSES  Diagnosis: Previous  section  Assessment and Plan of Treatment: 20 primary csection for failure to progress

## 2020-10-30 LAB — SURGICAL PATHOLOGY STUDY: SIGNIFICANT CHANGE UP

## 2020-11-03 ENCOUNTER — APPOINTMENT (OUTPATIENT)
Dept: OBGYN | Facility: CLINIC | Age: 25
End: 2020-11-03

## 2020-11-06 ENCOUNTER — APPOINTMENT (OUTPATIENT)
Dept: OBGYN | Facility: CLINIC | Age: 25
End: 2020-11-06
Payer: MEDICAID

## 2020-11-06 PROCEDURE — 99072 ADDL SUPL MATRL&STAF TM PHE: CPT

## 2020-11-06 PROCEDURE — 99212 OFFICE O/P EST SF 10 MIN: CPT

## 2020-11-26 RX ORDER — NITROFURANTOIN MACROCRYSTAL 50 MG
1 CAPSULE ORAL
Qty: 14 | Refills: 0
Start: 2020-11-26 | End: 2020-12-02

## 2021-01-01 NOTE — ED ADULT NURSE NOTE - NS ED PATIENT SAFETY CONCERN
No I have seen and examined the baby and reviewed all labs. I reviewed prenatal history with mother;     Physical Exam:  Gen: NAD  HEENT: anterior fontanel open soft and flat, no cleft lip/palate, ears normal set, + left ear pit, no tags. no lesions in mouth/throat,  red reflex positive bilaterally, nares clinically patent  Resp: good air entry and clear to auscultation bilaterally  Cardio: Normal S1/S2, regular rate and rhythm, +faint systolic murmur, no rubs or gallops, 2+ femoral pulses bilaterally  Abd: soft, non tender, non distended, normal bowel sounds, no organomegaly,  umbilical stump clean/ intact  Neuro: +grasp/suck/sukhi, normal tone  Extremities: negative cason and ortolani, full range of motion x 4, no crepitus  Skin: pink  Genitals: Normal female anatomy,  Chuy 1, anus visually patent with noted thickened extra skin tissue in perineum     Well  via ; maternal fever during labor with EOS <1; vitals q4hrs x 36hrs; murmur <24hrs old; continue to monitor clinically; perineum with noted thickened tissue - surgery consulted to evaluate  Routine  care;   Feeding and  care were discussed today. Parent questions were answered    Darlyn Bhakta MD

## 2021-05-20 ENCOUNTER — APPOINTMENT (OUTPATIENT)
Dept: OBGYN | Facility: CLINIC | Age: 26
End: 2021-05-20

## 2021-05-30 NOTE — PATIENT PROFILE ADULT. - VISION (WITH CORRECTIVE LENSES IF THE PATIENT USUALLY WEARS THEM):
Patient should be seen in Ed or urgent care.   
RN triage   Call from pt   Pt states he started a potassium pill  on Monday   Seen at [facility] on Tuesday and told that his legs were swollen and taking 1/2 bumex pill Q day   Since yesterday ' breathing hard' - with some activity and breathing hard a little bit at rest   No wheeze or stridor noted   Pt denies any chest pain -- no skipping beats -  Pt states he does feel like his heart is racing   No change in leg swelling since Tuesday   Pt does not check BP or P at home   Pt wants to come in for labs only  --   Per protocol = should go to ED  Pt does not want to go to ED   Please advise   Gwen Hickman RN [facility] RN triage      Reason for Disposition    MODERATE difficulty breathing (e.g., speaks in phrases, SOB even at rest, pulse 100-120) of new onset or worse than normal    Protocols used: BREATHING DIFFICULTY-A-OH      
Reason contacted:  symptom  Information relayed:  Below message   Additional questions:  No  Further follow-up needed:  No  Okay to leave a detailed message:  No    
Normal vision: sees adequately in most situations; can see medication labels, newsprint

## 2021-12-31 ENCOUNTER — EMERGENCY (EMERGENCY)
Facility: HOSPITAL | Age: 26
LOS: 1 days | Discharge: HOME | End: 2021-12-31
Attending: EMERGENCY MEDICINE | Admitting: EMERGENCY MEDICINE
Payer: MEDICAID

## 2021-12-31 VITALS
DIASTOLIC BLOOD PRESSURE: 64 MMHG | HEART RATE: 82 BPM | RESPIRATION RATE: 18 BRPM | OXYGEN SATURATION: 100 % | TEMPERATURE: 98 F | SYSTOLIC BLOOD PRESSURE: 108 MMHG | HEIGHT: 64 IN

## 2021-12-31 VITALS
TEMPERATURE: 98 F | OXYGEN SATURATION: 100 % | HEART RATE: 85 BPM | SYSTOLIC BLOOD PRESSURE: 107 MMHG | RESPIRATION RATE: 19 BRPM | DIASTOLIC BLOOD PRESSURE: 72 MMHG

## 2021-12-31 DIAGNOSIS — Z98.891 HISTORY OF UTERINE SCAR FROM PREVIOUS SURGERY: Chronic | ICD-10-CM

## 2021-12-31 LAB
ALBUMIN SERPL ELPH-MCNC: 4.6 G/DL — SIGNIFICANT CHANGE UP (ref 3.3–5)
ALP SERPL-CCNC: 53 U/L — SIGNIFICANT CHANGE UP (ref 40–120)
ALT FLD-CCNC: 10 U/L — SIGNIFICANT CHANGE UP (ref 4–33)
ANION GAP SERPL CALC-SCNC: 11 MMOL/L — SIGNIFICANT CHANGE UP (ref 7–14)
APPEARANCE UR: CLEAR — SIGNIFICANT CHANGE UP
AST SERPL-CCNC: 16 U/L — SIGNIFICANT CHANGE UP (ref 4–32)
BACTERIA # UR AUTO: ABNORMAL
BASOPHILS # BLD AUTO: 0.03 K/UL — SIGNIFICANT CHANGE UP (ref 0–0.2)
BASOPHILS NFR BLD AUTO: 0.5 % — SIGNIFICANT CHANGE UP (ref 0–2)
BILIRUB SERPL-MCNC: <0.2 MG/DL — SIGNIFICANT CHANGE UP (ref 0.2–1.2)
BILIRUB UR-MCNC: NEGATIVE — SIGNIFICANT CHANGE UP
BLD GP AB SCN SERPL QL: NEGATIVE — SIGNIFICANT CHANGE UP
BUN SERPL-MCNC: 13 MG/DL — SIGNIFICANT CHANGE UP (ref 7–23)
CALCIUM SERPL-MCNC: 9.4 MG/DL — SIGNIFICANT CHANGE UP (ref 8.4–10.5)
CHLORIDE SERPL-SCNC: 103 MMOL/L — SIGNIFICANT CHANGE UP (ref 98–107)
CO2 SERPL-SCNC: 25 MMOL/L — SIGNIFICANT CHANGE UP (ref 22–31)
COLOR SPEC: YELLOW — SIGNIFICANT CHANGE UP
COMMENT - URINE: SIGNIFICANT CHANGE UP
COMMENT - URINE: SIGNIFICANT CHANGE UP
CREAT SERPL-MCNC: 0.63 MG/DL — SIGNIFICANT CHANGE UP (ref 0.5–1.3)
DIFF PNL FLD: NEGATIVE — SIGNIFICANT CHANGE UP
EOSINOPHIL # BLD AUTO: 0.09 K/UL — SIGNIFICANT CHANGE UP (ref 0–0.5)
EOSINOPHIL NFR BLD AUTO: 1.4 % — SIGNIFICANT CHANGE UP (ref 0–6)
EPI CELLS # UR: 2 /HPF — SIGNIFICANT CHANGE UP (ref 0–5)
GLUCOSE SERPL-MCNC: 92 MG/DL — SIGNIFICANT CHANGE UP (ref 70–99)
GLUCOSE UR QL: NEGATIVE — SIGNIFICANT CHANGE UP
HCG SERPL-ACNC: 8359 MIU/ML — SIGNIFICANT CHANGE UP
HCT VFR BLD CALC: 32.2 % — LOW (ref 34.5–45)
HGB BLD-MCNC: 10.5 G/DL — LOW (ref 11.5–15.5)
HYALINE CASTS # UR AUTO: 0 /LPF — SIGNIFICANT CHANGE UP (ref 0–7)
IANC: 3.58 K/UL — SIGNIFICANT CHANGE UP (ref 1.5–8.5)
IMM GRANULOCYTES NFR BLD AUTO: 0.2 % — SIGNIFICANT CHANGE UP (ref 0–1.5)
KETONES UR-MCNC: ABNORMAL
LEUKOCYTE ESTERASE UR-ACNC: ABNORMAL
LYMPHOCYTES # BLD AUTO: 1.99 K/UL — SIGNIFICANT CHANGE UP (ref 1–3.3)
LYMPHOCYTES # BLD AUTO: 31.6 % — SIGNIFICANT CHANGE UP (ref 13–44)
MCHC RBC-ENTMCNC: 28.4 PG — SIGNIFICANT CHANGE UP (ref 27–34)
MCHC RBC-ENTMCNC: 32.6 GM/DL — SIGNIFICANT CHANGE UP (ref 32–36)
MCV RBC AUTO: 87 FL — SIGNIFICANT CHANGE UP (ref 80–100)
MONOCYTES # BLD AUTO: 0.6 K/UL — SIGNIFICANT CHANGE UP (ref 0–0.9)
MONOCYTES NFR BLD AUTO: 9.5 % — SIGNIFICANT CHANGE UP (ref 2–14)
NEUTROPHILS # BLD AUTO: 3.58 K/UL — SIGNIFICANT CHANGE UP (ref 1.8–7.4)
NEUTROPHILS NFR BLD AUTO: 56.8 % — SIGNIFICANT CHANGE UP (ref 43–77)
NITRITE UR-MCNC: NEGATIVE — SIGNIFICANT CHANGE UP
NRBC # BLD: 0 /100 WBCS — SIGNIFICANT CHANGE UP
NRBC # FLD: 0 K/UL — SIGNIFICANT CHANGE UP
PH UR: 6.5 — SIGNIFICANT CHANGE UP (ref 5–8)
PLATELET # BLD AUTO: 194 K/UL — SIGNIFICANT CHANGE UP (ref 150–400)
POTASSIUM SERPL-MCNC: 3.7 MMOL/L — SIGNIFICANT CHANGE UP (ref 3.5–5.3)
POTASSIUM SERPL-SCNC: 3.7 MMOL/L — SIGNIFICANT CHANGE UP (ref 3.5–5.3)
PROT SERPL-MCNC: 7.2 G/DL — SIGNIFICANT CHANGE UP (ref 6–8.3)
PROT UR-MCNC: ABNORMAL
RBC # BLD: 3.7 M/UL — LOW (ref 3.8–5.2)
RBC # FLD: 12.5 % — SIGNIFICANT CHANGE UP (ref 10.3–14.5)
RBC CASTS # UR COMP ASSIST: 2 /HPF — SIGNIFICANT CHANGE UP (ref 0–4)
RH IG SCN BLD-IMP: POSITIVE — SIGNIFICANT CHANGE UP
SODIUM SERPL-SCNC: 139 MMOL/L — SIGNIFICANT CHANGE UP (ref 135–145)
SP GR SPEC: 1.03 — SIGNIFICANT CHANGE UP (ref 1–1.05)
UROBILINOGEN FLD QL: ABNORMAL
WBC # BLD: 6.3 K/UL — SIGNIFICANT CHANGE UP (ref 3.8–10.5)
WBC # FLD AUTO: 6.3 K/UL — SIGNIFICANT CHANGE UP (ref 3.8–10.5)
WBC UR QL: 17 /HPF — HIGH (ref 0–5)

## 2021-12-31 PROCEDURE — 76817 TRANSVAGINAL US OBSTETRIC: CPT | Mod: 26

## 2021-12-31 PROCEDURE — 99285 EMERGENCY DEPT VISIT HI MDM: CPT

## 2021-12-31 RX ORDER — CEFTRIAXONE 500 MG/1
1000 INJECTION, POWDER, FOR SOLUTION INTRAMUSCULAR; INTRAVENOUS ONCE
Refills: 0 | Status: COMPLETED | OUTPATIENT
Start: 2021-12-31 | End: 2021-12-31

## 2021-12-31 RX ADMIN — CEFTRIAXONE 100 MILLIGRAM(S): 500 INJECTION, POWDER, FOR SOLUTION INTRAMUSCULAR; INTRAVENOUS at 23:29

## 2021-12-31 NOTE — ED PROVIDER NOTE - NS_EDPROVIDERDISPOUSERTYPE_ED_A_ED
Attending Attestation (For Attendings USE Only)... Niacinamide Counseling: I recommended taking niacin or niacinamide, also know as vitamin B3, twice daily. Recent evidence suggests that taking vitamin B3 (500 mg twice daily) can reduce the risk of actinic keratoses and non-melanoma skin cancers. Side effects of vitamin B3 include flushing and headache.

## 2021-12-31 NOTE — CONSULT NOTE ADULT - SUBJECTIVE AND OBJECTIVE BOX
Gyn Consult Note  NATASHA STEARNS  26y  Female [id number]    HPI: 27y/o  presenting to the ED complaining of cramping and spotting. GYN consulted for PUL.    Patient reports being followed with her private OBGYN for serial US and bHCG. Her bHCG has been uptrending however in office TVUS has not showed anything as of yet. She reports being sent in by her OBGYn for concern for ectopic pregnancy. She reports spotting that started today but has now subsided. She also reports intermittent abdominal cramping. She denies ___.    Name of GYN Physician: Dr. Osborne ([facility])    OBHx:    - C/S x2  - MAb x1 s/p D&C  GYNHx: Denies fibroids, cysts, endometriosis, STI's, Abnormal pap smears   PMHx: Denies  PSHx: C/S x2, D&C x1  Meds: None  Allergies: NKDA      Vital Signs Last 24 Hrs  T(C): 36.7 (31 Dec 2021 21:20), Max: 36.8 (31 Dec 2021 19:49)  T(F): 98 (31 Dec 2021 21:20), Max: 98.2 (31 Dec 2021 19:49)  HR: 85 (31 Dec 2021 21:20) (82 - 85)  BP: 107/72 (31 Dec 2021 21:20) (107/72 - 108/64)  BP(mean): --  RR: 19 (31 Dec 2021 21:20) (18 - 19)  SpO2: 100% (31 Dec 2021 21:20) (100% - 100%)    Physical Exam:   General: sitting comfortably in bed, NAD   CV: RRR  Lungs: CTAB  Abd: Soft, non-tender, non-distended.  Bowel sounds present.    : No bleeding on pad. External labia wnl.   Ext: non-tender b/l, no edema     LABS:             10.5   6.30  )-----------( 194      ( 31 Dec 2021 21:57 )             32.2     12-    139  |  103  |  13  ----------------------------<  92  3.7   |  25  |  0.63    Ca    9.4      31 Dec 2021 21:57    TPro  7.2  /  Alb  4.6  /  TBili  <0.2  /  DBili  x   /  AST  16  /  ALT  10  /  AlkPhos  53      Urinalysis Basic - ( 31 Dec 2021 21:57 )  Color: Yellow / Appearance: Clear / S.033 / pH: x  Gluc: x / Ketone: Trace  / Bili: Negative / Urobili: 3 mg/dL   Blood: x / Protein: 30 mg/dL / Nitrite: Negative   Leuk Esterase: Moderate / RBC: 2 /HPF / WBC 17 /HPF   Sq Epi: x / Non Sq Epi: 2 /HPF / Bacteria: Occasional      RADIOLOGY & ADDITIONAL STUDIES:    < from: US Transvaginal, OB (21 @ 22:49) >  ACC: [id number] EXAM:  US OB TRANSVAGINAL                        PROCEDURE DATE:  2021    INTERPRETATION:  CLINICAL INFORMATION: No IUP seen at outpatient OB/GYN.   Evaluate for ectopic. Serum beta-hC.  LMP: 10/18/2021  Estimated Gestational Age by LMP: 10 weeks 4 days  COMPARISON: Pelvic ultrasound 10/27/2020.  Endovaginal pelvic sonogram only.  FINDINGS:  Uterus: An intrauterine gestational sac is seen without fetal pole or   yolk sac. Small cystic structures are seen in the endometrium as noted on   prior pelvic ultrasound 10/27/2020.  Gestational Sac Size (mean): 0.9 cm  Gestations Sac Shape : Abnormal.  Estimated Gestational Age: 5 weeks 4 days by gestational sac size  Right ovary: 4.1 cm x 2.2 cm x 1.8cm. Within normal limits. Corpus   luteal cyst.  Left ovary: 2.3 cm x 2.5 cm x 1.7 cm. Within normal limits.  Fluid: None.  IMPRESSION:  Single intrauterine gestation sac without fetal pole or yolk sac. The   gestational sac has a slightly crenulated appearance.  Estimated gestational age based off of gestational sac size is 5 weeks 4   days, discordant with reported LMP.  Recommend serial ultrasound and/or beta hCG to assess progression.  --- End of Report ---  WALT PERSON MD; Resident Radiologist  This document has been electronically signed.  ASIA CACERES MD; Attending Radiologist  This document has been electronically signed. Dec 31 2021 11:11PM  < end of copied text >

## 2021-12-31 NOTE — ED PROVIDER NOTE - PHYSICAL EXAMINATION
GENERAL: young female, lying in bed, NAD. Vital signs are within normal limits  EYES: Conjunctiva noninjected or pale, sclera anicteric  HENT: NC/AT, moist mucous membranes  NECK: Supple, trachea midline  LUNG: Nonlabored respirations, no wheezes, rales  CV: RRR, Pulses- Radial/dorsalis pedis: 2+ bilateral and equal  ABDOMEN: Nondistended, nontender, no guarding  : Vaginal exam chaperoned by Dr. Bloch .   -Inspection: No external lesions on labia.  -Bimanual exam: No adnexal tenderness. No CMT. Closed OS. No palpable fibroids. No blood on glove  MSK: No visible deformities, nontender extremities, no CVA ttp  SKIN: No rashes, bruises  NEURO: AAOx4 (to person, place, time, event), no tremor, steady gait  PSYCH: Normal mood and affect

## 2021-12-31 NOTE — ED PROVIDER NOTE - OBJECTIVE STATEMENT
26F  lmp 2021 (irreg periods), + hcg  400 ->  ->1000, no visible IUP in office US here for 2 days of vaginal bleeding with intermittent achy/sharp suprapubic cramping w/o radiationg, fevers, chills, urinary sxs. Sent in by [facility] to [facility] for [facility].

## 2021-12-31 NOTE — ED PROVIDER NOTE - ATTENDING CONTRIBUTION TO CARE
DR. BLOCH, ATTENDING MD-  I performed a face to face bedside interview with patient regarding history of present illness, review of symptoms and past medical history. I completed an independent physical exam.  I have discussed patient's plan of care with the resident.  Patient examined, well appearing NAd HEENT nml heart s1s2, lungs clear, abd soft nontender, extrem no edema. pulses intact. no CVA tenderness, pelvic supervised, no tenderness.

## 2021-12-31 NOTE — ED PROVIDER NOTE - NS ED ROS FT
CONSTITUTIONAL: No fevers, chills, fatigue, unexpected weight change, decreased appetite  ENT: No nasal congestion, runny nose, sore throat  CV: No chest pain  PULM: No cough, shortness of breath  GI: + abdominal pain. No nausea, vomiting, diarrhea, constipation, bloody stools  : + vaginal bleeding. No dysuria, polyuria, hematuria  SKIN: No rashes, swelling  MSK: No back pain, flank pain  NEURO: No headache  PSYCH: No SI/HI, hallucinations

## 2021-12-31 NOTE — ED ADULT NURSE NOTE - OBJECTIVE STATEMENT
Patient arrives to room TrB, A&Ox4 , ambulatory at baseline, c/o severe abdominal pain since this am with vaginal bleeding x 2 days, patient had a elevated HCG level outpatient but states that they were unable to find a baby in the womb with ultrasound. breathing even and unlabored, pallor/diaphoresis not noted. Denies chest pain, shortness of breath, nausea, vomiting or diarrhea. vital signs as charted, patient pending further orders at this time.

## 2021-12-31 NOTE — ED ADULT TRIAGE NOTE - CHIEF COMPLAINT QUOTE
Pt co severe  abdominal pain since this am with vaginal bleeding x 2 days. Pt saw ob doctor had elevated hcg levels had u/s unable visualize pregnancy. pt took motrin 600mg for pain 2 hours ago with mild relief.

## 2021-12-31 NOTE — ED PROVIDER NOTE - PATIENT PORTAL LINK FT
You can access the FollowMyHealth Patient Portal offered by [facility] by registering at the following website: [email or web address]. By joining [employer]’s FollowMyHealth portal, you will also be able to view your health information using other applications (apps) compatible with our system.

## 2021-12-31 NOTE — ED PROVIDER NOTE - NSFOLLOWUPINSTRUCTIONS_ED_ALL_ED_FT
You were seen here for pelvic pain in early pregnancy , Your US showed a gestational sac , but no fetal pole or yolk sac.   Follow up for repeat us and HCg with OB on Monday Jan 3rd.  Nothing in the vagina , no intercourse or tampons   no motrin, tylenol for pain,   Keflex 500 mg 3x a day for 7 days.   Return to the ED for worsening pain, heavy bleeding or new or concerning symptoms.    Advance activity as tolerated.  Continue all previously prescribed medications as directed unless otherwise instructed.  Follow up with your primary care physician in 48-72 hours- bring copies of your results.  Return to the ER for worsening or persistent symptoms, and/or ANY NEW OR CONCERNING SYMPTOMS. If you have issues obtaining follow up, please call: [phone] to obtain a doctor or specialist who takes your insurance in your area.  You may call [phone] to make an appointment with the internal medicine clinic.

## 2021-12-31 NOTE — CONSULT NOTE ADULT - ASSESSMENT
25y/o  presenting to the ED complaining of cramping and spotting. GYN consulted for PUL. Patient in stable condition, VSS, vaginal spotting has stopped. TVUS with likely intrauterine gestational sac, bHCG 8359, H/H stable.   -Diagnosis of pregnancy of unknown location reviewed at length with patient- early IUP vs failed IUP vs ectopic pregnancy  -Emphasized need for repeat bhcg in 48 hours to trend hormone levels in order to provide definitive diagnosis   -Recommend f/u in 48 hours for repeat bhcg in ED vs private GYN office with Dr. Osborne  -T&S O+, Rhogam not indicated  -Ectopic precautions reviewed, pt advised to return to the ED if she experiences intense abdominal pain, vomiting inability to tolerate PO.  -Bleeding precautions reviewed. Pt advised to return to the ED is she is soaking through 1 large pad/hour for > 2 hours, clot passage larger than fist-sized, experiences lightheadedness, dizziness, vomiting, inability to tolerate PO.    D/W Dr. Hailey Montiel, PGY-2 27y/o  presenting to the ED complaining of cramping and spotting. GYN consulted for PUL. Patient in stable condition, VSS, vaginal spotting has stopped. TVUS with likely intrauterine gestational sac, bHCG 8359, H/H stable.   -Diagnosis of pregnancy of unknown location reviewed at length with patient- early IUP vs failed IUP vs ectopic pregnancy  -Emphasized need for repeat bhcg in 48 hours to trend hormone levels in order to provide definitive diagnosis   -Recommend f/u in 48 hours for repeat bhcg in ED vs private GYN office with Dr. Osborne  -T&S O+, Rhogam not indicated  -Ectopic precautions reviewed, pt advised to return to the ED if she experiences intense abdominal pain, vomiting inability to tolerate PO.  -Bleeding precautions reviewed. Pt advised to return to the ED is she is soaking through 1 large pad/hour for > 2 hours, clot passage larger than fist-sized, experiences lightheadedness, dizziness, vomiting, inability to tolerate PO.    D/W Dr. Hailey Montiel, PGY-2    27y/o  with PUL, likely early IUP; clinically stable, to f/u as outpatient.  Robert Hart M.D.

## 2021-12-31 NOTE — ED PROVIDER NOTE - CLINICAL SUMMARY MEDICAL DECISION MAKING FREE TEXT BOX
26F  lmp 2021 (irreg periods) here for 2 days of vaginal bleeding with surapubic abd cramps w/o other infectious or urinary sxs, uptrend bHCG from 400 to 1000 from  -> . Vitals wnl. No adnexal tenderness, closed os, no blood on glove. Eval for ectopic vs miscarraige vs uti. check labs, T&S, TVUS, UA.

## 2022-01-01 RX ORDER — CEPHALEXIN 500 MG
1 CAPSULE ORAL
Qty: 21 | Refills: 0
Start: 2022-01-01 | End: 2022-01-07

## 2022-01-04 LAB
CULTURE RESULTS: SIGNIFICANT CHANGE UP
SPECIMEN SOURCE: SIGNIFICANT CHANGE UP

## 2022-01-28 NOTE — ED PROVIDER NOTE - EYES NEGATIVE STATEMENT, MLM
PM / NOC SHIFT NOTE     Participation/interaction/behavior in programming _ Pt was admitted to [facility] this evening. Pt displayed an anxious and guarded affect upon approach. Writer explained to pt her point sheet during wrap-up group. Pt was mostly quiet. Pt used the phone this evening to make a few phone calls and was tearful at the end. Pt stated that she is worried about her friends because they are all worried about her. Pt stated that she need to \"prioritize her mental health\" because it's not her first hospitalization. Pt stated that she was been to [facility] in 2018 and has been feeling depressed on and off. Pt was very tearful and anxious. Writer advised pt to try to rest for the night and get some sleep since it was late. Writer also told pt that she will have the opportunity to speak with her psychiatrist and  tomorrow. Writer gave pt some water, tissues, and a book from the dayroom. Pt eventually felt better and went to sleep. Pt denied SI/HI/AVH. Contracted verbally for safety, Q 15 min SP maintained. Pt compliant with wearing a face mask when out on milieu. No sleep issues reported / observed throughout NOC shift.    Phone time: pt utilized the phone tonight    Personal hygiene/shower: pt did not shower this evening    Bed time routine: Pt was observed sleeping by 22:00    Maintained safety precautions Q15 minutes.    no discharge, no irritation, no pain, no redness, and no visual changes.

## 2022-04-01 NOTE — ED ADULT NURSE NOTE - NS ED NURSE DISCH DISPOSITION
Patient : Davin Nair Age: 45 year old Sex: male   MRN: [id number] Encounter Date: 4/1/2022      History     Chief Complaint   Patient presents with   • Shoulder Pain   • Leg Pain     45 year old male with no PMH presents to the ED complaining of left shoulder and leg pain following a fall yesterday. PT states that he fell approximately 5 ft from the bed of a truck to the ground. PT denies hitting his head or losing consciousness. PT denies HA, dizziness, changes in vision, NV, CP, SOB, abdominal pain, loss of bladder or bowel, and altered sensation.       The history is provided by the patient. No  was used.   Shoulder Pain     Leg Pain         No Known Allergies    Discharge Medication List as of 4/1/2022 10:55 AM      Prior to Admission Medications    Details   ibuprofen (MOTRIN) 600 MG tablet Take 1 tablet by mouth every 6 hours as needed for Pain.Normal, Disp-20 tablet,R-0             Past Medical History:   Diagnosis Date   • No pertinent past medical history        Past Surgical History:   Procedure Laterality Date   • KNEE SURGERY Right        Family History   Problem Relation Age of Onset   • Patient is unaware of any medical problems Mother    • Patient is unaware of any medical problems Father        Social History     Tobacco Use   • Smoking status: Current Every Day Smoker   • Smokeless tobacco: Never Used   Substance Use Topics   • Alcohol use: Yes     Comment: Socially, last drink 2 days ago   • Drug use: Yes     Types: Marijuana       Review of Systems   Constitutional: Negative.    Respiratory: Negative.    Cardiovascular: Negative.    Gastrointestinal: Negative.    Musculoskeletal: Positive for arthralgias, back pain and myalgias. Negative for neck pain and neck stiffness.   Skin: Positive for wound.   Neurological: Negative.        Physical Exam     ED Triage Vitals [04/01/22 0920]   ED Triage Vitals Group      Temp 98.8 °F (37.1 °C)      Heart Rate 82      Resp 16      BP  98/65      SpO2 98 %      EtCO2 mmHg       Height       Weight       Weight Scale Used       BMI (Calculated)       IBW/kg (Calculated)        Physical Exam  Vitals reviewed.   Constitutional:       General: He is not in acute distress.     Appearance: Normal appearance. He is normal weight. He is not ill-appearing.      Comments: PT is ambulatory with minor limp   HENT:      Head: Normocephalic.      Right Ear: External ear normal.      Left Ear: External ear normal.      Nose: Nose normal.      Mouth/Throat:      Mouth: Mucous membranes are moist.   Eyes:      Conjunctiva/sclera: Conjunctivae normal.   Cardiovascular:      Rate and Rhythm: Normal rate and regular rhythm.   Pulmonary:      Effort: Pulmonary effort is normal.      Breath sounds: Normal breath sounds.   Musculoskeletal:      Right shoulder: Normal.      Left shoulder: Tenderness and bony tenderness present. No swelling, deformity or crepitus. Normal range of motion. Normal strength.      Right upper arm: Normal.      Left upper arm: Normal.      Right elbow: Normal.      Left elbow: Normal.      Right wrist: Normal.      Left wrist: Normal.      Right hip: Normal.      Left hip: Normal.      Right upper leg: Normal.      Left upper leg: Swelling and tenderness present. No deformity or bony tenderness.      Right knee: Normal.      Left knee: Normal.      Right lower leg: Normal.      Left lower leg: Tenderness present. No swelling, deformity or bony tenderness.      Right ankle: Normal.      Left ankle: Normal.        Legs:    Skin:     General: Skin is warm and dry.      Capillary Refill: Capillary refill takes less than 2 seconds.      Findings: Bruising present.   Neurological:      General: No focal deficit present.      Mental Status: He is alert and oriented to person, place, and time.      Sensory: No sensory deficit.      Motor: No weakness.   Psychiatric:         Mood and Affect: Mood normal.         Behavior: Behavior normal.         ED  Course     Procedures    Lab Results     No results found for this visit on 04/01/22.        Radiology Results     Imaging Results          XR SHOULDER 3 VIEWS LEFT (Final result)  Result time 04/01/22 10:36:39    Final result                 Impression:    No definitive acute osseous abnormality of visualized left  shoulder.  Recommend followup as clinically warranted.    Electronically Signed by: EDUARD BRYANT M.D.   Signed on: 4/1/2022 10:36 AM                Narrative:    EXAM: XR SHOULDER 3 VIEWS LEFT 04/01/2022    INDICATION: Left shoulder pain following injury by fall.    COMPARISON: None.    Three views of left shoulder are submitted.    FINDINGS: No definitive acute fracture, dislocation, or significant osseous  abnormality of visualized left shoulder is seen.                                ED Medication Orders (From admission, onward)    None               MDM  Number of Diagnoses or Management Options  Contusion of left lower extremity, initial encounter  Contusion of left shoulder, initial encounter  Contusion of left thigh, initial encounter  Diagnosis management comments: PT given discharge paperwork and instructed to wait in exam room for medication administration. PT left prior to receiving medication.    I have formulated a discharge action plan for this patient: 1) I have given the patient comprehensive discharge instructions and instructed them on the use of any OTC or RX medications involved in their discharge care. 2) I have carefully and comprehensively answered any questions and addressed any concerns that the patient had regarding their medical illness today and their discharge care and follow up. 3) I have carefully and repeatedly discussed with the patient that the patient needs follow up with a primary care provider or specialist, and as necessary I have given this information to the patient. 4) The patient feels comfortable going home at this time, the patient verbally expresses that the  understand the discharge action plan and clearly understand to return to the ER or call 911 if symptoms worsen, and to absolutely follow up as described and directed above. The patient has appropriate transport home and has verbalized understanding of instructions given.         Amount and/or Complexity of Data Reviewed  Tests in the radiology section of CPT®: ordered and reviewed    Patient Progress  Patient progress: stable      Clinical Impression     ED Diagnosis   1. Contusion of left shoulder, initial encounter     2. Contusion of left thigh, initial encounter     3. Contusion of left lower extremity, initial encounter         Disposition        Discharge 4/1/2022 10:52 AM  Davin Nair discharge to home/self care.                         Lidya Watson PA-C  04/01/22 2547     Left without being seen (saw a nurse but never saw a physician or midlevel provider)

## 2022-05-11 ENCOUNTER — RESULT REVIEW (OUTPATIENT)
Age: 27
End: 2022-05-11

## 2022-08-15 ENCOUNTER — EMERGENCY (EMERGENCY)
Facility: HOSPITAL | Age: 27
LOS: 1 days | Discharge: HOME | End: 2022-08-15
Attending: EMERGENCY MEDICINE | Admitting: EMERGENCY MEDICINE

## 2022-08-15 VITALS
HEART RATE: 80 BPM | TEMPERATURE: 98 F | SYSTOLIC BLOOD PRESSURE: 101 MMHG | OXYGEN SATURATION: 98 % | HEIGHT: 64 IN | RESPIRATION RATE: 22 BRPM | DIASTOLIC BLOOD PRESSURE: 61 MMHG

## 2022-08-15 DIAGNOSIS — Z98.891 HISTORY OF UTERINE SCAR FROM PREVIOUS SURGERY: Chronic | ICD-10-CM

## 2022-08-15 PROCEDURE — 99284 EMERGENCY DEPT VISIT MOD MDM: CPT

## 2022-08-15 NOTE — ED PROVIDER NOTE - CPE EDP SKIN NORM
Attempted to call report. RN stated there was a mixup and she could not take report right now. RN stated she would call back. normal...

## 2022-08-15 NOTE — ED PROVIDER NOTE - CLINICAL SUMMARY MEDICAL DECISION MAKING FREE TEXT BOX
pt p/w persistent although improving cough for the last week, dyuet to pregnancy limited meds available, and pt agrees with plan to attempt cough med like honey, netti pot and requested rvp and will f/u with pmd. strict return precautions.

## 2022-08-15 NOTE — ED PROVIDER NOTE - OBJECTIVE STATEMENT
27 y/o f with no pmhx p/w cough, b/l rib pain a/w cough. states child had conjunctivitis last week. pt admits to non productive cough, had strep neg and covid neg so far. pt admits to mild sob 2 days ago, symptoms have persisted for 7 days. but states symptopms are improving. pt denies any cp at rest, no pleuritic type chestp ain, concerned for possible babcterial infection   pt also admits to being approx 8 weeks preg with no pelvic or ob complaints including no vag bleeding, no lower abd pain.  with 2 miscarraiges.

## 2022-08-15 NOTE — ED PROVIDER NOTE - PROGRESS NOTE DETAILS
lungs clear to auscultaition. shared decision making with pt, due to pregnancy, limited meds to provide, instructed to attempt shelton wheat honey, symptomatically improve and f/u with pmd. return to ed if begins developing vag bleeding or worsening sob, pleuritic type cp.

## 2022-08-15 NOTE — ED PROVIDER NOTE - PATIENT PORTAL LINK FT
You can access the FollowMyHealth Patient Portal offered by [facility] by registering at the following website: [email or web address]. By joining [employer]’s FollowMyHealth portal, you will also be able to view your health information using other applications (apps) compatible with our system.

## 2023-03-10 NOTE — DISCHARGE NOTE NURSING/CASE MANAGEMENT/SOCIAL WORK - NSDPLANG ASIS_GEN_ALL_CORE
1. \"Have you been to the ER, urgent care clinic since your last visit? Hospitalized since your last visit? \" No    2. \"Have you seen or consulted any other health care providers outside of the [address] [place] [address] since your last visit? \" No     3. For patients aged 39-70: Has the patient had a colonoscopy / FIT/ Cologuard? NA - based on age      If the patient is female:    4. For patients aged 41-77: Has the patient had a mammogram within the past 2 years? No      5. For patients aged 21-65: Has the patient had a pap smear? Yes - 9/2022 - [place]     3/10/2023      Chief Complaint   Patient presents with    Establish Care         History of Present Illness:         is a 38 y.o. female establishing care. 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 Hx of sleeve gastrectomy in 2021, lost about #100, has gained just a little back. Has chronic reflux since then, also problems with iron deficiency, but has since had endometrial ablation. Feels well, new partner, is concerned about STI exposure. No urogenital sxs. Allergies   Allergen Reactions    Bee Venom Protein (Honey Bee) Anaphylaxis    Codeine Other (comments)       Current Outpatient Medications   Medication Sig    norgestimate-ethinyl estradioL (ORTHO-CYCLEN, SPRINTEC) 0.25-35 mg-mcg tab     omeprazole (PRILOSEC) 40 mg capsule omeprazole 40 mg capsule,delayed release    ZOLMitriptan (ZOMIG-ZMT) 2.5 mg disintegrating tablet zolmitriptan 2.5 mg disintegrating tablet    ferrous gluconate 324 mg (38 mg iron) tablet Take 324 mg by mouth two (2) times a day. Lactobacillus acidophilus (PROBIOTIC PO) Take  by mouth.    multivitamin (ONE A DAY) tablet Take 1 Tablet by mouth daily. BIOTIN PO Take  by mouth. No current facility-administered medications for this visit.            Physical Examination:    Visit Vitals  /82 (BP 1 Location: Left upper arm, BP Patient Position: Sitting, BP Cuff Size: Adult)   Pulse 80   Temp 96.9 °F (36.1 °C) (Oral)   Resp 16   Ht 5' 9\" (1.753 m)   Wt 213 lb 8 oz (96.8 kg)   SpO2 99%   BMI 31.53 kg/m²      General:  Alert, cooperative, no distress. HEENT:  Normocephalic, without obvious abnormality, atraumatic. Conjunctivae/corneas clear. Pupils equal, round, reactive to light. Extraocular movements intact. TMs and external canals normal bilaterally. Nasal mucosa and oropharynx clear. Lungs: Clear to auscultation bilaterally. Chest wall:  No tenderness or deformity. Heart:  Regular rate and rhythm, S1, S2 normal, no murmur, click, rub, or gallop. Abdomen:   Soft, non-tender. Bowel sounds normal. No masses. No organomegaly. Extremities: Extremities normal, atraumatic, no cyanosis or edema. Pulses: 2+ and symmetric all extremities. Skin: Skin color, texture, turgor normal. No rashes or lesions. Lymph nodes: Cervical, supraclavicular, and axillary nodes normal.   Neurologic: CNII-XII intact. Normal strength, sensation, and reflexes throughout. ASSESSMENT AND PLAN    1. Migraine with aura and without status migrainosus, not intractable  stable    2. GERD without esophagitis  Restart PPI  - omeprazole (PRILOSEC) 40 mg capsule; omeprazole 40 mg capsule,delayed release  Dispense: 90 Capsule; Refill: 3    3. Iron deficiency anemia, unspecified iron deficiency anemia type    - CBC WITH AUTOMATED DIFF; Future  - IRON PROFILE; Future    4. Malabsorption syndrome  Reminded to take supplements  - IRON PROFILE; Future  - VITAMIN D, 25 HYDROXY; Future    5. STD exposure    - HIV 1/2 AG/AB, 4TH GENERATION,W RFLX CONFIRM; Future  - RPR; Future  - Janell [place] / GC-AMPLIFIED;  Future              Orders Placed This Encounter    CBC WITH AUTOMATED DIFF     Standing Status:   Future     Standing Expiration Date:   3/10/2024    IRON PROFILE     Standing Status:   Future     Standing Expiration Date:   3/10/2024    VITAMIN D, 25 HYDROXY     Standing Status:   Future     Standing Expiration Date:   3/10/2024    HIV 1/2 AG/AB, 4TH GENERATION,W RFLX CONFIRM     Standing Status:   Future     Standing Expiration Date:   3/10/2024    RPR     Standing Status:   Future     Standing Expiration Date:   3/10/2024    CHLAMYDIA / GC-AMPLIFIED     Standing Status:   Future     Standing Expiration Date:   3/10/2024     Order Specific Question:   Specimen source     Answer:   Urine [258]    norgestimate-ethinyl estradioL (ORTHO-CYCLEN, SPRINTEC) 0.25-35 mg-mcg tab    omeprazole (PRILOSEC) 40 mg capsule     Sig: omeprazole 40 mg capsule,delayed release    ZOLMitriptan (ZOMIG-ZMT) 2.5 mg disintegrating tablet     Sig: zolmitriptan 2.5 mg disintegrating tablet    ferrous gluconate 324 mg (38 mg iron) tablet     Sig: Take 324 mg by mouth two (2) times a day. Lactobacillus acidophilus (PROBIOTIC PO)     Sig: Take  by mouth.    multivitamin (ONE A DAY) tablet     Sig: Take 1 Tablet by mouth daily. BIOTIN PO     Sig: Take  by mouth.            Catlheen Dickens MD Quality 110: Preventive Care And Screening: Influenza Immunization: Influenza Immunization previously received during influenza season Quality 130: Documentation Of Current Medications In The Medical Record: Current Medications Documented No Quality 226: Preventive Care And Screening: Tobacco Use: Screening And Cessation Intervention: Patient screened for tobacco use and is an ex/non-smoker Quality 431: Preventive Care And Screening: Unhealthy Alcohol Use - Screening: Patient not identified as an unhealthy alcohol user when screened for unhealthy alcohol use using a systematic screening method Quality 111:Pneumonia Vaccination Status For Older Adults: Pneumococcal vaccine (PPSV23) administered on or after patient’s 60th birthday and before the end of the measurement period Detail Level: Detailed

## 2023-04-06 NOTE — ED ADULT NURSE NOTE - ISOLATION TYPE:
Continued Stay Note  NEIL Montero     Patient Name: Norm Tracey  MRN: [id number]  Today's Date: 4/6/2023    Admit Date: 4/4/2023    Plan: Home with spouse   Discharge Plan     Row Name 04/06/23 2985       Plan    Plan Comments [facility].  His spouse thinks his PCP has already ordered.               Discharge Codes    No documentation.               Expected Discharge Date and Time     Expected Discharge Date Expected Discharge Time    Apr 7, 2023             THAI Velasco     None

## 2024-02-09 ENCOUNTER — EMERGENCY (EMERGENCY)
Facility: HOSPITAL | Age: 29
LOS: 1 days | Discharge: HOME | End: 2024-02-09
Attending: STUDENT IN AN ORGANIZED HEALTH CARE EDUCATION/TRAINING PROGRAM
Payer: MEDICAID

## 2024-02-09 VITALS
OXYGEN SATURATION: 97 % | RESPIRATION RATE: 18 BRPM | DIASTOLIC BLOOD PRESSURE: 74 MMHG | TEMPERATURE: 98 F | HEART RATE: 95 BPM | WEIGHT: 138.89 LBS | HEIGHT: 63 IN | SYSTOLIC BLOOD PRESSURE: 111 MMHG

## 2024-02-09 VITALS
SYSTOLIC BLOOD PRESSURE: 105 MMHG | HEART RATE: 78 BPM | DIASTOLIC BLOOD PRESSURE: 62 MMHG | OXYGEN SATURATION: 99 % | RESPIRATION RATE: 18 BRPM

## 2024-02-09 DIAGNOSIS — Z98.891 HISTORY OF UTERINE SCAR FROM PREVIOUS SURGERY: Chronic | ICD-10-CM

## 2024-02-09 PROCEDURE — 99284 EMERGENCY DEPT VISIT MOD MDM: CPT

## 2024-02-09 PROCEDURE — 99283 EMERGENCY DEPT VISIT LOW MDM: CPT

## 2024-02-09 RX ORDER — LIDOCAINE AND PRILOCAINE CREAM 25; 25 MG/G; MG/G
1 CREAM TOPICAL ONCE
Refills: 0 | Status: COMPLETED | OUTPATIENT
Start: 2024-02-09 | End: 2024-02-09

## 2024-02-09 RX ADMIN — LIDOCAINE AND PRILOCAINE CREAM 1 APPLICATION(S): 25; 25 CREAM TOPICAL at 07:39

## 2024-02-09 NOTE — ED ADULT NURSE NOTE - ED STAT RN HANDOFF WHERE
I have been inappropriately named as PCP for this patient  This is Dr Millie Ayala' patient  red/shirley

## 2024-02-09 NOTE — ED ADULT NURSE NOTE - OBJECTIVE STATEMENT
29 yo female PMH C section x3, A&ox3, presents to ED c/o rectal pain.  Pt reports saturday night started feeling rectal itching, burning, thought it was pinworms, saw her PCP and referred to GI that told pt it was an anal fissure/ hemorrhoids.  PT has had persistent burning/itching no relief w/ creams.  BReathing even and unlabored, abdomen soft nontender, no pedal edema.  Pt denies chest pain, palpitations, shortness of breath, headache, visual disturbances, numbness/tingling, fever, chills, diaphoresis,  nausea, vomiting, constipation, diarrhea, or urinary symptoms.  PT does report she does feel something by rectum when wiping.

## 2024-02-09 NOTE — ED PROVIDER NOTE - PATIENT PORTAL LINK FT
You can access the FollowMyHealth Patient Portal offered by [facility] by registering at the following website: [email or web address]. By joining [employer]’s FollowMyHealth portal, you will also be able to view your health information using other applications (apps) compatible with our system.

## 2024-02-09 NOTE — ED PROVIDER NOTE - PHYSICAL EXAMINATION
Physical Exam:  Gen: NAD, AOx3, non-toxic appearing, able to ambulate without assistance  Head: NCAT  HEENT: EOMI, PEERLA, normal conjunctiva, tongue midline, oral mucosa moist  Lung: CTAB, no respiratory distress, no wheezes/rhonchi/rales B/L, speaking in full sentences  CV: RRR  Abd: soft, NT, ND, no guarding, no rigidity, no rebound tenderness, no CVA tenderness   MSK: no visible deformities, ROM normal in UE/LE, no back pain  Neuro: No focal sensory or motor deficits  Skin: Warm, well perfused, no rash, no leg swelling  Psych: normal affect, calm    Rectal exam with Dr. Simeon- external Hemmorrhoids visualized - non thrombosed.

## 2024-02-09 NOTE — ED PROVIDER NOTE - NSFOLLOWUPINSTRUCTIONS_ED_ALL_ED_FT
Hemorrhoids    Continue preparation H and Sitz Baths. Follow up with GI and Colorectal Surgeon.     WHAT YOU NEED TO KNOW:    Hemorrhoids are swollen blood vessels inside your rectum (internal hemorrhoids) or on your anus (external hemorrhoids). Sometimes a hemorrhoid may prolapse. This means it extends out of your anus.    DISCHARGE INSTRUCTIONS:    Return to the emergency department if:   •You have severe pain in your rectum or around your anus.    •You have severe pain in your abdomen and you are vomiting.     •You have bleeding from your anus that soaks through your underwear.     Contact your healthcare provider if:   •You have frequent and painful bowel movements.    •Your hemorrhoid looks or feels more swollen than usual.     •You do not have a bowel movement for 2 days or more.     •You see or feel tissue coming through your anus.     •You have questions or concerns about your condition or care.    Medicines: You may need any of the following:   •A pad, cream, or ointment can help decrease pain, swelling, and itching.     •Stool softeners help treat or prevent constipation.     •NSAIDs, such as ibuprofen, help decrease swelling, pain, and fever. NSAIDs can cause stomach bleeding or kidney problems in certain people. If you take blood thinner medicine, always ask your healthcare provider if NSAIDs are safe for you. Always read the medicine label and follow directions.    •Take your medicine as directed. Contact your healthcare provider if you think your medicine is not helping or if you have side effects. Tell him or her if you are allergic to any medicine. Keep a list of the medicines, vitamins, and herbs you take. Include the amounts, and when and why you take them. Bring the list or the pill bottles to follow-up visits. Carry your medicine list with you in case of an emergency.    Manage your symptoms:   •Apply ice on your anus for 15 to 20 minutes every hour or as directed. Use an ice pack, or put crushed ice in a plastic bag. Cover it with a towel before you apply it to your anus. Ice helps prevent tissue damage and decreases swelling and pain.    •Take a sitz bath. Fill a bathtub with 4 to 6 inches of warm water. You may also use a sitz bath pan that fits inside a toilet bowl. Sit in the sitz bath for 15 minutes. Do this 3 times a day, and after each bowel movement. The warm water can help decrease pain and swelling.     •Keep your anal area clean. Gently wash the area with warm water daily. Soap may irritate the area. After a bowel movement, wipe with moist towelettes or wet toilet paper. Dry toilet paper can irritate the area.     Prevent hemorrhoids:   •Do not strain to have a bowel movement. Do not sit on the toilet too long. These actions can increase pressure on the tissues in your rectum and anus.     •Drink plenty of liquids. Liquids can help prevent constipation. Ask how much liquid to drink each day and which liquids are best for you.     •Eat a variety of high-fiber foods. Examples include fruits, vegetables, and whole grains. Ask your healthcare provider how much fiber you need each day. You may need to take a fiber supplement.    •Exercise as directed. Exercise, such as walking, may make it easier to have a bowel movement. Ask your healthcare provider to help you create an exercise plan.     •Do not have anal sex. Anal sex can weaken the skin around your rectum and anus.     •Avoid heavy lifting. This can cause straining and increase your risk for another hemorrhoid.     Follow up with your doctor as directed: Write down your questions so you remember to ask them during your visits.

## 2024-02-09 NOTE — ED PROVIDER NOTE - ATTENDING CONTRIBUTION TO CARE
I have personally performed a face to face medical and diagnostic evaluation of the patient. I have discussed with and reviewed the Resident's note and agree with the History, ROS, Physical Exam and MDM unless otherwise indicated. A brief summary of my personal evaluation and impression can be found below.    28-year-old female presenting with chief complaint of rectal pain.  Of note patient diagnosed with hemorrhoids recently, has issues with constipation in the past, after recent diagnosis started on a bowel regimen, and a cooling gel as well as Preparation H with steroids.  States that recently has been having worsening pain, even though she has had normal bowel movements in the last few days.  Denies any fevers chills, seen by a colorectal specialist.  On exam, vital signs stable, no reproducible abdominal tenderness to palpation.  Patient overall well-appearing.  Rectal exam chaperoned by Dr. Yeboah, resident.  Small external hemorrhoid noted, nonthrombosed.  Small fissure noted at the 12 o'clock position.  Given symptoms, will try a topical anesthetic such as lidocaine gel or cream.  If temporarily helps, can go home with colorectal follow-up.  Reassess to dispo. Val Pena, ED Attending

## 2024-02-09 NOTE — ED ADULT NURSE REASSESSMENT NOTE - NS ED NURSE REASSESS COMMENT FT1
0720 Pt in ER red/shirley rm 36 right. A&Ox4. c/o rectal burning, itching and discomfort. Denies rectal bleeding at present. A&Ox4 0720 Pt in ER red/shirley rm 36 right. A&Ox4. c/o rectal burning, itching and discomfort. Denies rectal bleeding at present. 0720 Pt in ER red/shirley rm 37 left. A&Ox4. c/o rectal burning, itching and discomfort. Denies rectal bleeding at present.

## 2024-02-09 NOTE — ED PROVIDER NOTE - CLINICAL SUMMARY MEDICAL DECISION MAKING FREE TEXT BOX
Patient presents to the ED W/ rectal pain. Patient is hemodynamically stable and afebrile on presentation. Patient pe significant for hemorrhoid (external) - non thrombosed. Patient is on stool softener and preparation H @ home.  We will tx patient symptomatically and reassess patient.

## 2024-02-09 NOTE — ED ADULT TRIAGE NOTE - CHIEF COMPLAINT QUOTE
pt reports 1 week of worsening rectal itching, now also with 3 days of abdominal pain and rectal pain; denies any bleeding

## 2024-02-09 NOTE — ED PROVIDER NOTE - PROGRESS NOTE DETAILS
Irlanda Flores PGY2: I received sign out on this patient. I have reassessed patient and agree with the current plan. Lidocaine patched provided minimal relief. will give follow up w colorectal surgeon. pt has GI doctor. has been taking prep H and sitz baths.

## 2024-02-09 NOTE — ED PROVIDER NOTE - OBJECTIVE STATEMENT
Patient is a 28-year-old female with no send past medical history presents emergency department complaining of rectal pain and rectal itching.  Patient states she has been dealing with rectal pain and rectal itching for last week.  Patient initially thought she had a worm infection.  Patient took over-the-counter medication.  Patient states she went to her PCP after the rectal pain and itching got worse a few days ago.  Patient PCP gave her GI referral and told the patient that she was constipated.  Patient was given stool softener.  Patient states she went to GI doctor and was told she had hemorrhoids.  Patient has been taking Preparation H at home with little relief.  Patient states the itching is becoming unbearable and she cannot function at work.  Patient denies any blood in her stool.  Patient states that she has been dealing with constipation intermittently but she has never dealt with hemorrhoids.  Patient denies any fevers, chills, chest pain, shortness of breath.  Patient denies any vague abdominal pain.

## 2024-04-01 NOTE — ED PROVIDER NOTE - NS ED ATTENDING STATEMENT MOD
I agree with the history, physical, and plan, which I have reviewed and edited where appropriate.  I agree with notes/assessment of health care providers on my service.  I have personally examined the patient.  I was physically present for the key portions of the evaluation and management (E/M) service provided.  I reviewed data and laboratory tests/x-rays and all pertinent electronic images.  The patient is a critical care patient with life threatening hemodynamic and metabolic instability in SICU.  Risk benefit analyses discussed.    The patient is in SICU with diagnosis mentioned in the note.    The plan is specified below.    ASSESSMENT  65F PMH HTN, COPD, Chronic respiratory failure, s/p tracheostomy, on Ventilator (2004), tracheomalacia, tracheal stenosis, s/p trache replaced 3/1/2024 by ENT, s/p bronchoscopy for stomaplasty (2022), Pneumothorax (s/p Chest tube, 2020), Dysphagia s/p Peg (3 years ago), Atypical chest pain on Nitro prn, recent visit to [place] ER 2 weeks ago for dislodged peg s/p replacement, Bronchiectasis, recent Pneumonia s/p abx 3 weeks ago, now s/p trach change w/ esophageal dilation.  Remains on the vent.       PLAN    NEURO  - Pain control: Tylenol, oxy PRN    RESP: chronic respiratory failure, chronically vent-dependent   - VENT -- 16/5/30,  - f/u ABG  - albuterol     CV  - MAP goal >65  - nitroglycerine prn     GI/NUTRITION: esophageal stricture s/p dilation   - Diet --  CLD w/ TF via PEG   - Protonix IV daily  - bedside speech and swallow     RENAL/  - IV lock   - Reich    HEME  - DVT ppx -- SQH  - [id number]    ID  - ABX -- None    ENDO  - Home Rx: Dapagliflozin   - ISS
Attending with

## 2024-05-22 NOTE — OB RN INTRAOPERATIVE NOTE - NS_DRESSINGLOCATION_OBGYN_ALL_OB_FT
No abnormal movements
lower abdomen

## 2024-07-22 NOTE — OB RN PATIENT PROFILE - NS_PAINMANGEPLANS_OBGYN_ALL_OB
Pt daughter called because she is inquired about pt scan results. Stated they got them completed last week.    Breathing/Relaxation

## 2024-10-17 NOTE — ED PROVIDER NOTE - CONDITION AT DISCHARGE:
Patient for about 2 weeks has weird feeling in her chest when she starts walking fast or exercise and also her left arm pain.Last phy was on 11/09/2022. Phone call transferred to RN  
Spoke with patient, c/o chest pain, pressure and left arm tingling. Pt notes this increases after increased physical activity such as walking/exercising. Advised patient to go to nearest ER for immediate evaluation. Pt agrees. Advised patient to call back after discharge to schedule follow up visit. Pt agrees.   
Improved

## 2025-01-27 NOTE — ED ADULT TRIAGE NOTE - BP NONINVASIVE DIASTOLIC (MM HG)
[facility]  [facility]  [address]  [place] [address]  Dept: [phone]    Digna Guaman is a 63 y.o. female established patient, who presents today for her medical conditions/complaints as noted below:    Chief Complaint   Patient presents with   • Dry Mouth     Patient presents today with a dry mouth, drops and mouth washes aren't helping. Medication refill.       HPI:     Dry mouth - uses lozenges and mouth rinses, which do not seem to help.    HTN - metoprolol XL 50 mg daily    GERD - not any better with Nexium. Pantoprazole 40 mg daily, previously.    HLD - rosuvastatin 20 mg daily    Hx of MI - ASA 81 mg daily, imdur 60 mg daily    Urinary incontinence - oxybutynin XR 15 mg     RLS - Requip 3 mg nightly    Migraines - managed by neurology, topamax 50 mg 3x daily; sumatriptan PRN    Asthma, mild, intermittent - Flovent PRN, albuterol inhaler PRN    Anxiety and depression - Seroquel 100 mg BID, hydroxyzine PRN    Chronic neck pain - tizanidine 4 mg TID PRN    Reviewed prior notes: previous PCP  Reviewed previous labs.    No components found for: \"LDLCHOLESTEROL\", \"LDLCALC\"    (goal LDL is <100)   AST (U/L)   Date Value   11/27/2018 17     ALT (U/L)   Date Value   11/27/2018 9     BUN (mg/dL)   Date Value   11/28/2018 8     Hemoglobin A1C (%)   Date Value   01/06/2016 5.8     TSH (mIU/L)   Date Value   02/04/2016 1.65     BP Readings from Last 3 Encounters:   01/27/25 118/80   04/11/24 135/65   01/23/24 128/84          (goal 120/80)    Past Medical History:   Diagnosis Date   • Anxiety and depression    • Asthma    • Back injury    • CAD (coronary artery disease) 11/2018    Heart Attack. Stent [facility] at [facility]   • Cataracts, bilateral    • Chronic back pain 1986   • Gastroesophageal reflux disease without esophagitis 08/13/2015   • H/O blood clots    • Headache(784.0)    • Heart attack (HCC)    • Hypertension    • Knee injury    • 
52

## 2025-06-06 ENCOUNTER — EMERGENCY (EMERGENCY)
Facility: HOSPITAL | Age: 30
LOS: 1 days | End: 2025-06-06
Attending: EMERGENCY MEDICINE
Payer: MEDICAID

## 2025-06-06 VITALS
OXYGEN SATURATION: 97 % | HEART RATE: 115 BPM | TEMPERATURE: 100 F | DIASTOLIC BLOOD PRESSURE: 62 MMHG | RESPIRATION RATE: 18 BRPM | SYSTOLIC BLOOD PRESSURE: 98 MMHG | WEIGHT: 145.06 LBS | HEIGHT: 63 IN

## 2025-06-06 DIAGNOSIS — Z98.891 HISTORY OF UTERINE SCAR FROM PREVIOUS SURGERY: Chronic | ICD-10-CM

## 2025-06-06 LAB
BASOPHILS # BLD AUTO: 0.05 K/UL — SIGNIFICANT CHANGE UP (ref 0–0.2)
BASOPHILS NFR BLD AUTO: 0.6 % — SIGNIFICANT CHANGE UP (ref 0–2)
EOSINOPHIL # BLD AUTO: 0.08 K/UL — SIGNIFICANT CHANGE UP (ref 0–0.5)
EOSINOPHIL NFR BLD AUTO: 0.9 % — SIGNIFICANT CHANGE UP (ref 0–6)
GAS PNL BLDV: SIGNIFICANT CHANGE UP
HCT VFR BLD CALC: 28.5 % — LOW (ref 34.5–45)
HGB BLD-MCNC: 8.9 G/DL — LOW (ref 11.5–15.5)
IMM GRANULOCYTES NFR BLD AUTO: 0.9 % — SIGNIFICANT CHANGE UP (ref 0–0.9)
LYMPHOCYTES # BLD AUTO: 1.46 K/UL — SIGNIFICANT CHANGE UP (ref 1–3.3)
LYMPHOCYTES # BLD AUTO: 17.1 % — SIGNIFICANT CHANGE UP (ref 13–44)
MCHC RBC-ENTMCNC: 25.4 PG — LOW (ref 27–34)
MCHC RBC-ENTMCNC: 31.2 G/DL — LOW (ref 32–36)
MCV RBC AUTO: 81.4 FL — SIGNIFICANT CHANGE UP (ref 80–100)
MONOCYTES # BLD AUTO: 0.72 K/UL — SIGNIFICANT CHANGE UP (ref 0–0.9)
MONOCYTES NFR BLD AUTO: 8.4 % — SIGNIFICANT CHANGE UP (ref 2–14)
NEUTROPHILS # BLD AUTO: 6.14 K/UL — SIGNIFICANT CHANGE UP (ref 1.8–7.4)
NEUTROPHILS NFR BLD AUTO: 72.1 % — SIGNIFICANT CHANGE UP (ref 43–77)
NRBC BLD AUTO-RTO: 0 /100 WBCS — SIGNIFICANT CHANGE UP (ref 0–0)
PLATELET # BLD AUTO: 283 K/UL — SIGNIFICANT CHANGE UP (ref 150–400)
RBC # BLD: 3.5 M/UL — LOW (ref 3.8–5.2)
RBC # FLD: 13.6 % — SIGNIFICANT CHANGE UP (ref 10.3–14.5)
WBC # BLD: 8.53 K/UL — SIGNIFICANT CHANGE UP (ref 3.8–10.5)
WBC # FLD AUTO: 8.53 K/UL — SIGNIFICANT CHANGE UP (ref 3.8–10.5)

## 2025-06-06 PROCEDURE — 71045 X-RAY EXAM CHEST 1 VIEW: CPT | Mod: 26

## 2025-06-06 PROCEDURE — 99284 EMERGENCY DEPT VISIT MOD MDM: CPT

## 2025-06-06 RX ORDER — CEFTRIAXONE 500 MG/1
1000 INJECTION, POWDER, FOR SOLUTION INTRAMUSCULAR; INTRAVENOUS ONCE
Refills: 0 | Status: COMPLETED | OUTPATIENT
Start: 2025-06-06 | End: 2025-06-06

## 2025-06-06 RX ORDER — VANCOMYCIN HCL IN 5 % DEXTROSE 1.5G/250ML
1000 PLASTIC BAG, INJECTION (ML) INTRAVENOUS ONCE
Refills: 0 | Status: COMPLETED | OUTPATIENT
Start: 2025-06-06 | End: 2025-06-06

## 2025-06-06 NOTE — ED PROVIDER NOTE - CLINICAL SUMMARY MEDICAL DECISION MAKING FREE TEXT BOX
28 y/o female no pmh presents with breast pain. She is 3 weeks post partum from , was diagnosed with mastitis 3 days ago, has been compliant with antibiotics but symptoms are getting worse. 28 y/o female no pmh presents with breast pain. She is 3 weeks post partum from , was diagnosed with mastitis 3 days ago, has been compliant with antibiotics but symptoms are getting worse. She is hemodynamically stable, afebrile, tachycardic 110s, on exam there is tenderness to palpation of inferior left breast without significant erythema, discharge, or fluctuant mass. Will send for US to eval for abscess, IV abx, blood cultures, reassess.

## 2025-06-06 NOTE — ED PROVIDER NOTE - ATTENDING CONTRIBUTION TO CARE
I, Darrell Alcantar MD, Emergency Medicine Attending Physician, personally saw and examined the patient and I personally made/approve the management plan and take responsibility for the patient management.    MDM: 29-year-old female who presents with left-sided breast pain, diagnosed with mastitis 3 days ago and has been compliant with antibiotics but has been having worsening symptoms and fever.  Patient had similar episode during her second pregnancy and had to be admitted for IV vancomycin.  Patient had delivered at [facility] on 5/17/2025.    On examination, patient with tachycardia, borderline low BP, borderline elevated temperature, well-appearing, in no acute distress. Cardiac examination RRR, lungs CTAB.  Abdomen is soft and nontender.  Neurovascularly intact in all 4 extremities.     Introduced self to patient explained role as attending physician.  Explained need and purpose of breast exam and obtained patient's consent.  Chaperoned by female RN and myself.  Exam performed by resident physician.    Concern for sepsis as patient is positive for SIRS criteria with suspected source.  Will obtain breast ultrasound to eval for abscess.. Will obtain labs including lactate and blood cultures. Will give IV fluids and empiric broad-spectrum IV antibiotics.  Will obtain chest x-ray to evaluate for acute cardiopulmonary pathology.    Signed out at 2300 pending labs and imaging and close reassessments for further treatment and admission.

## 2025-06-06 NOTE — ED PROVIDER NOTE - PATIENT PORTAL LINK FT
You can access the FollowMyHealth Patient Portal offered by [facility] by registering at the following website: [email or web address]. By joining [employer]’s FollowMyHealth portal, you will also be able to view your health information using other applications (apps) compatible with our system.

## 2025-06-06 NOTE — ED PROVIDER NOTE - OBJECTIVE STATEMENT
28 y/o female no pmh presents with breast pain. She is 3 weeks post partum from , was diagnosed with mastitis 3 days ago, has been compliant with antibiotics but symptoms are getting worse. 28 y/o female no pmh presents with breast pain. She is 3 weeks post partum from , was diagnosed with mastitis 3 days ago, has been compliant with antibiotics but symptoms are getting worse. She has been febrile to 102-103 with significant pain from left breast. She is not breast-feeding or pumping, denies sensation of engorgement. She had this happen during her second pregnancy, required admission for IV vancomycin to treat symptoms.

## 2025-06-06 NOTE — ED ADULT NURSE NOTE - NSFALLUNIVINTERV_ED_ALL_ED
Assistance OOB with selected safe patient handling equipment if applicable/Bed/Stretcher in lowest position, wheels locked, appropriate side rails in place/Call bell, personal items and telephone in reach/Instruct patient to call for assistance before getting out of bed/chair/stretcher/Non-slip footwear applied when patient is off stretcher/[place] to call system/Physically safe environment - no spills, clutter or unnecessary equipment/Purposeful proactive rounding/Room/bathroom lighting operational, light cord in reach

## 2025-06-06 NOTE — ED ADULT NURSE NOTE - OBJECTIVE STATEMENT
28 y/o F AxOx4 presents to the ED complaining of fever, chills x 5 days. Patient has no significant past medical history, delivered via  at [facility] on . . Patient 30 y/o F AxOx4 presents to the ED complaining of fever, chills, pain to the L breast x 5 days. Patient has no significant past medical history, delivered via  at [facility] on . . Patient was started on Amoxicillin by outpatient GYN on Wednesday with no improvement- Temp max 103F as per patient. Patient was breastfeeding/pumping but then stopped as per GYN recommendation.  area dry, clean, intact with mild pain. Patient has a patent airway, breathing is unlabored and spontaneous. Denies chest pain, shortness of breath, blurry vision, cough, chills, numbness, N/V/D, recent sick contact/travel, abdominal pain. Patient placed in gown, side rails up for safety, bed in lowest position, advised to ask RN if assistance is needed, patient and family educated on plan of care, comfort and safety provided.

## 2025-06-06 NOTE — ED PROVIDER NOTE - PROGRESS NOTE DETAILS
No abscess on ultrasound. Patient has no childcare, is leaving AMA. Will send bactrim for MRSA coverage. The patient has decided to leave against medical advice.  The patient is AAOx3, not intoxicated, and displays normal decision making ability. We discussed all risks, benefits, and alternatives to the progression of treatment and the potential dangers of leaving including but not limited to permanent disability, injury, and death.  The patient was instructed that they are welcome to change their decision to leave against medical advice and return to the emergency department at any time and for any reason in order to allow us to render care.

## 2025-06-06 NOTE — ED PROVIDER NOTE - NSFOLLOWUPINSTRUCTIONS_ED_ALL_ED_FT
You have been evaluated for breast pain, fevers related to mastitis. We recommended hospital admission for IV antibiotics, but you have chose to leave against medical advice due to inability to obtain . Please take bactrim as prescribed and follow up with your OBGYN.    Please return to Emergency Department immediately for any new, concerning, or worsening symptoms.     Any results obtained today during your evaluation is attached and available in your portal. Please take all your results to follow up with your primary care doctor so that they can determine if you need any additional testing or treatment as an outpatient.     Please take prescriptions as discussed.

## 2025-06-07 VITALS
OXYGEN SATURATION: 100 % | HEART RATE: 94 BPM | DIASTOLIC BLOOD PRESSURE: 56 MMHG | TEMPERATURE: 99 F | RESPIRATION RATE: 19 BRPM | SYSTOLIC BLOOD PRESSURE: 103 MMHG

## 2025-06-07 LAB
ALBUMIN SERPL ELPH-MCNC: 3.6 G/DL — SIGNIFICANT CHANGE UP (ref 3.3–5)
ALP SERPL-CCNC: 90 U/L — SIGNIFICANT CHANGE UP (ref 40–120)
ALT FLD-CCNC: 16 U/L — SIGNIFICANT CHANGE UP (ref 10–45)
ANION GAP SERPL CALC-SCNC: 14 MMOL/L — SIGNIFICANT CHANGE UP (ref 5–17)
APPEARANCE UR: CLEAR — SIGNIFICANT CHANGE UP
APTT BLD: 32.3 SEC — SIGNIFICANT CHANGE UP (ref 26.1–36.8)
AST SERPL-CCNC: 22 U/L — SIGNIFICANT CHANGE UP (ref 10–40)
BACTERIA # UR AUTO: NEGATIVE /HPF — SIGNIFICANT CHANGE UP
BILIRUB SERPL-MCNC: 0.2 MG/DL — SIGNIFICANT CHANGE UP (ref 0.2–1.2)
BILIRUB UR-MCNC: NEGATIVE — SIGNIFICANT CHANGE UP
BUN SERPL-MCNC: 11 MG/DL — SIGNIFICANT CHANGE UP (ref 7–23)
CALCIUM SERPL-MCNC: 9.6 MG/DL — SIGNIFICANT CHANGE UP (ref 8.4–10.5)
CAST: 0 /LPF — SIGNIFICANT CHANGE UP (ref 0–4)
CHLORIDE SERPL-SCNC: 100 MMOL/L — SIGNIFICANT CHANGE UP (ref 96–108)
CO2 SERPL-SCNC: 22 MMOL/L — SIGNIFICANT CHANGE UP (ref 22–31)
COLOR SPEC: YELLOW — SIGNIFICANT CHANGE UP
CREAT SERPL-MCNC: 0.52 MG/DL — SIGNIFICANT CHANGE UP (ref 0.5–1.3)
DIFF PNL FLD: ABNORMAL
EGFR: 130 ML/MIN/1.73M2 — SIGNIFICANT CHANGE UP
EGFR: 130 ML/MIN/1.73M2 — SIGNIFICANT CHANGE UP
FLUAV AG NPH QL: SIGNIFICANT CHANGE UP
FLUBV AG NPH QL: SIGNIFICANT CHANGE UP
GLUCOSE SERPL-MCNC: 115 MG/DL — HIGH (ref 70–99)
GLUCOSE UR QL: NEGATIVE MG/DL — SIGNIFICANT CHANGE UP
INR BLD: 1.09 RATIO — SIGNIFICANT CHANGE UP (ref 0.85–1.16)
KETONES UR QL: ABNORMAL MG/DL
LEUKOCYTE ESTERASE UR-ACNC: ABNORMAL
NITRITE UR-MCNC: NEGATIVE — SIGNIFICANT CHANGE UP
PH UR: 7 — SIGNIFICANT CHANGE UP (ref 5–8)
POTASSIUM SERPL-MCNC: 3.6 MMOL/L — SIGNIFICANT CHANGE UP (ref 3.5–5.3)
POTASSIUM SERPL-SCNC: 3.6 MMOL/L — SIGNIFICANT CHANGE UP (ref 3.5–5.3)
PROT SERPL-MCNC: 7.1 G/DL — SIGNIFICANT CHANGE UP (ref 6–8.3)
PROT UR-MCNC: 30 MG/DL
PROTHROM AB SERPL-ACNC: 12.4 SEC — SIGNIFICANT CHANGE UP (ref 9.9–13.4)
RBC CASTS # UR COMP ASSIST: 14 /HPF — HIGH (ref 0–4)
RSV RNA NPH QL NAA+NON-PROBE: SIGNIFICANT CHANGE UP
SARS-COV-2 RNA SPEC QL NAA+PROBE: SIGNIFICANT CHANGE UP
SODIUM SERPL-SCNC: 136 MMOL/L — SIGNIFICANT CHANGE UP (ref 135–145)
SOURCE RESPIRATORY: SIGNIFICANT CHANGE UP
SP GR SPEC: 1.02 — SIGNIFICANT CHANGE UP (ref 1–1.03)
SQUAMOUS # UR AUTO: 1 /HPF — SIGNIFICANT CHANGE UP (ref 0–5)
UROBILINOGEN FLD QL: 1 MG/DL — SIGNIFICANT CHANGE UP (ref 0.2–1)
WBC UR QL: 41 /HPF — HIGH (ref 0–5)

## 2025-06-07 PROCEDURE — 87637 SARSCOV2&INF A&B&RSV AMP PRB: CPT

## 2025-06-07 PROCEDURE — 80053 COMPREHEN METABOLIC PANEL: CPT

## 2025-06-07 PROCEDURE — 93010 ELECTROCARDIOGRAM REPORT: CPT

## 2025-06-07 PROCEDURE — 76642 ULTRASOUND BREAST LIMITED: CPT | Mod: 26,LT

## 2025-06-07 PROCEDURE — 71045 X-RAY EXAM CHEST 1 VIEW: CPT

## 2025-06-07 PROCEDURE — 96374 THER/PROPH/DIAG INJ IV PUSH: CPT

## 2025-06-07 PROCEDURE — 96375 TX/PRO/DX INJ NEW DRUG ADDON: CPT

## 2025-06-07 PROCEDURE — 81001 URINALYSIS AUTO W/SCOPE: CPT

## 2025-06-07 PROCEDURE — 85025 COMPLETE CBC W/AUTO DIFF WBC: CPT

## 2025-06-07 PROCEDURE — 84132 ASSAY OF SERUM POTASSIUM: CPT

## 2025-06-07 PROCEDURE — 85610 PROTHROMBIN TIME: CPT

## 2025-06-07 PROCEDURE — 85014 HEMATOCRIT: CPT

## 2025-06-07 PROCEDURE — 82803 BLOOD GASES ANY COMBINATION: CPT

## 2025-06-07 PROCEDURE — 87040 BLOOD CULTURE FOR BACTERIA: CPT

## 2025-06-07 PROCEDURE — 82330 ASSAY OF CALCIUM: CPT

## 2025-06-07 PROCEDURE — 76642 ULTRASOUND BREAST LIMITED: CPT

## 2025-06-07 PROCEDURE — 82947 ASSAY GLUCOSE BLOOD QUANT: CPT

## 2025-06-07 PROCEDURE — 82435 ASSAY OF BLOOD CHLORIDE: CPT

## 2025-06-07 PROCEDURE — 84295 ASSAY OF SERUM SODIUM: CPT

## 2025-06-07 PROCEDURE — 85018 HEMOGLOBIN: CPT

## 2025-06-07 PROCEDURE — 93005 ELECTROCARDIOGRAM TRACING: CPT

## 2025-06-07 PROCEDURE — 83605 ASSAY OF LACTIC ACID: CPT

## 2025-06-07 PROCEDURE — 87086 URINE CULTURE/COLONY COUNT: CPT

## 2025-06-07 PROCEDURE — 99285 EMERGENCY DEPT VISIT HI MDM: CPT | Mod: 25

## 2025-06-07 PROCEDURE — 85730 THROMBOPLASTIN TIME PARTIAL: CPT

## 2025-06-07 RX ORDER — SULFAMETHOXAZOLE/TRIMETHOPRIM 800-160 MG
1 TABLET ORAL
Qty: 14 | Refills: 0
Start: 2025-06-07 | End: 2025-06-13

## 2025-06-07 RX ADMIN — Medication 1000 MILLILITER(S): at 00:08

## 2025-06-07 RX ADMIN — CEFTRIAXONE 100 MILLIGRAM(S): 500 INJECTION, POWDER, FOR SOLUTION INTRAMUSCULAR; INTRAVENOUS at 00:08

## 2025-06-07 RX ADMIN — Medication 250 MILLIGRAM(S): at 01:12

## 2025-06-08 LAB
CULTURE RESULTS: NO GROWTH — SIGNIFICANT CHANGE UP
SPECIMEN SOURCE: SIGNIFICANT CHANGE UP

## 2025-07-23 NOTE — ED ADULT NURSE NOTE - BIRTH SEX
Research Note Non-Treatment Day    Minal Sun is here today. Patient is on [id number]. Today is C8D8. Procedures completed per protocol. AE's and con-meds reviewed with patient. Patient denies new or worsening AE's at this time. Patient is aware of treatment plan and will return 7/30 for C8D15.      Education Documentation  Treatment Plan and Schedule, taught by Jazzy Guerrero RN at 7/23/2025 12:23 PM.  Learner: Patient  Readiness: Acceptance  Method: Explanation  Response: Verbalizes Understanding    General Medication Information, taught by Jazzy Guerrero RN at 7/23/2025 12:23 PM.  Learner: Patient  Readiness: Acceptance  Method: Explanation  Response: Verbalizes Understanding    Comprehensive Metabolic Panel (CMP), taught by Jazzy Guerrero RN at 7/23/2025 12:23 PM.  Learner: Patient  Readiness: Acceptance  Method: Explanation  Response: Verbalizes Understanding    Complete Blood Count with Differential (CBC w/ Diff), taught by Jazzy Guerrero RN at 7/23/2025 12:23 PM.  Learner: Patient  Readiness: Acceptance  Method: Explanation  Response: Verbalizes Understanding    Education Comments  No comments found.       
Female